# Patient Record
Sex: FEMALE | Race: OTHER | Employment: FULL TIME | URBAN - METROPOLITAN AREA
[De-identification: names, ages, dates, MRNs, and addresses within clinical notes are randomized per-mention and may not be internally consistent; named-entity substitution may affect disease eponyms.]

---

## 2017-03-14 ENCOUNTER — OFFICE VISIT (OUTPATIENT)
Dept: INTERNAL MEDICINE CLINIC | Facility: CLINIC | Age: 27
End: 2017-03-14

## 2017-03-14 DIAGNOSIS — J20.8 ACUTE BACTERIAL BRONCHITIS: Primary | ICD-10-CM

## 2017-03-14 DIAGNOSIS — Z13.31 DEPRESSION SCREENING: ICD-10-CM

## 2017-03-14 DIAGNOSIS — B96.89 ACUTE BACTERIAL BRONCHITIS: Primary | ICD-10-CM

## 2017-03-14 RX ORDER — AZITHROMYCIN 250 MG/1
TABLET, FILM COATED ORAL
Qty: 6 TAB | Refills: 0 | Status: SHIPPED | OUTPATIENT
Start: 2017-03-14 | End: 2017-03-19

## 2017-03-15 VITALS
RESPIRATION RATE: 18 BRPM | SYSTOLIC BLOOD PRESSURE: 120 MMHG | BODY MASS INDEX: 30.53 KG/M2 | HEIGHT: 66 IN | TEMPERATURE: 98.9 F | OXYGEN SATURATION: 99 % | HEART RATE: 109 BPM | WEIGHT: 190 LBS | DIASTOLIC BLOOD PRESSURE: 77 MMHG

## 2017-03-15 RX ORDER — ALBUTEROL SULFATE 90 UG/1
POWDER, METERED RESPIRATORY (INHALATION)
Refills: 0 | COMMUNITY
Start: 2017-03-08 | End: 2017-03-16

## 2017-03-15 NOTE — PROGRESS NOTES
Subjective:      Gbae Yin is a 31 y.o. female who is a new patient and is here to establish care and discuss cough. Previous followed by PCP Dr. Dain Angel. The following sections were reviewed & updated as appropriate: PMH, PL, PSH, FH, RxH, and SH. She is a . Cough: she was seen at [facility] and diagnosed with bronchitis last week. Flu testing was negative. She states she has had continued productive coughing and fevers since then. Today her fever was 101.8. She was given an albuterol inhaler and has been using tylenol and ibuprofen since to keep her fevers down. She states she is not sleeping due to the cough, has chest discomfort, and symptoms worsen when she lays down. She denies nausea, sore throat, sinus pressure. There are no active problems to display for this patient. Current Outpatient Prescriptions   Medication Sig Dispense Refill    PROAIR RESPICLICK 90 mcg/actuation aepb INL 2 PFS PO Q 4 H FOR 14 DAYS PRN  0    azithromycin (ZITHROMAX) 250 mg tablet Take two tablets today then one tablet daily. Finish entire course. 6 Tab 0    cyclobenzaprine (FLEXERIL) 10 mg tablet Take 1 Tab by mouth three (3) times daily as needed for Muscle Spasm(s). 20 Tab 0     No Known Allergies  Past Medical History:   Diagnosis Date    Anemia      Family History   Problem Relation Age of Onset    Diabetes Mother      Social History   Substance Use Topics    Smoking status: Former Smoker    Smokeless tobacco: Never Used      Comment: light social smoker    Alcohol use Yes      Comment: socially in moderation        Review of Systems    A comprehensive review of systems was negative except for that written in the HPI. Objective: There were no vitals taken for this visit.   General appearance: alert, cooperative, no distress, appears stated age  Head: Normocephalic, without obvious abnormality, atraumatic  Eyes: negative  Ears: normal TM's and external ear canals AU  Nose: Nares normal. Septum midline. Mucosa normal. No drainage or sinus tenderness. Throat: Lips, mucosa, and tongue normal. Teeth and gums normal  Neck: supple, symmetrical, trachea midline and mild anterior cervical adenopathy  Back: symmetric, no curvature. ROM normal. No CVA tenderness. Lungs: clear to auscultation throughout left lung fields, inspiratory wheeze noted in all right fields. No ronchi, rales noted  Heart: regular rate and rhythm, S1, S2 normal, no murmur, click, rub or gallop  Neurologic: Grossly normal  Nursing note and vitals reviewed  Assessment/Plan:       ICD-10-CM ICD-9-CM    1. Acute bacterial bronchitis J20.8 466.0     B96.89 041. 9      Visit was done while in downtime, CXR ordered and azithro sent to pharmacy. She will get CXR Thursday for continued fever or continued chest discomfort. Discussed when to escalate therapy and go to the ER. Advised her to call back or return to office if symptoms worsen/change/persist.  Discussed expected course/resolution/complications of diagnosis in detail with patient. Medication risks/benefits/costs/interactions/alternatives discussed with patient. She was given an after visit summary which includes diagnoses, current medications, & vitals. She expressed understanding with the diagnosis and plan.

## 2017-03-16 ENCOUNTER — OFFICE VISIT (OUTPATIENT)
Dept: INTERNAL MEDICINE CLINIC | Facility: CLINIC | Age: 27
End: 2017-03-16

## 2017-03-16 VITALS
HEIGHT: 66 IN | SYSTOLIC BLOOD PRESSURE: 121 MMHG | TEMPERATURE: 98.1 F | BODY MASS INDEX: 30.53 KG/M2 | RESPIRATION RATE: 18 BRPM | WEIGHT: 190 LBS | HEART RATE: 90 BPM | DIASTOLIC BLOOD PRESSURE: 73 MMHG

## 2017-03-16 DIAGNOSIS — B96.89 ACUTE BACTERIAL BRONCHITIS: Primary | ICD-10-CM

## 2017-03-16 DIAGNOSIS — J20.8 ACUTE BACTERIAL BRONCHITIS: Primary | ICD-10-CM

## 2017-03-16 PROBLEM — E66.9 OBESITY, CLASS I, BMI 30-34.9: Status: ACTIVE | Noted: 2017-03-16

## 2017-03-16 NOTE — MR AVS SNAPSHOT
Visit Information Date & Time Provider Department Dept. Phone Encounter #  
 3/16/2017  9:00 AM Najma Nayak NP [facility] Internal Medicine [phone] [id number] Follow-up Instructions Return if symptoms worsen or fail to improve. Upcoming Health Maintenance Date Due  
 PAP AKA CERVICAL CYTOLOGY 10/23/2010 INFLUENZA AGE 9 TO ADULT 8/1/2016 DTaP/Tdap/Td series (2 - Td) 3/26/2018 Allergies as of 3/16/2017  Review Complete On: 3/16/2017 By: Najma Nayak NP No Known Allergies Current Immunizations  Never Reviewed Name Date DTAP Vaccine 3/26/2008 Hepatitis B Vaccine 8/27/2001, 3/16/2001, 2/16/2001 IPV 4/16/2001, 2/16/2001, 7/23/1990 MMR Vaccine 3/16/2001, 2/16/2001 Varicella Virus Vaccine Live 2/16/2001  
 dT Vaccine 8/27/2001, 2/16/2001 Not reviewed this visit You Were Diagnosed With   
  
 Codes Comments Acute bacterial bronchitis    -  Primary ICD-10-CM: J20.8, B96.89 
ICD-9-CM: 466.0, 041.9 Vitals BP Pulse Temp Resp Height(growth percentile) Weight(growth percentile) 121/73 90 98.1 °F (36.7 °C) (Oral) 18 5' 6\" (1.676 m) 190 lb (86.2 kg) LMP BMI OB Status Smoking Status (Exact Date) 30.67 kg/m2 IUD Former Smoker BMI and BSA Data Body Mass Index Body Surface Area  
 30.67 kg/m 2 2 m 2 Preferred Pharmacy Pharmacy Name Phone [facility] [address] [place] [address] [phone] [facility] [phone] Your Updated Medication List  
  
   
This list is accurate as of: 3/16/17  9:25 AM.  Always use your most recent med list.  
  
  
  
  
 azithromycin 250 mg tablet Commonly known as:  Adam Franklin Take two tablets today then one tablet daily. Finish entire course. MIRENA 20 mcg/24 hr (5 years) IUD Generic drug:  levonorgestrel 1 Each by IntraUTERine route once. Follow-up Instructions Return if symptoms worsen or fail to improve. Patient Instructions Bronchitis: Care Instructions Your Care Instructions Bronchitis is inflammation of the bronchial tubes, which carry air to the lungs. The tubes swell and produce mucus, or phlegm. The mucus and inflamed bronchial tubes make you cough. You may have trouble breathing. Most cases of bronchitis are caused by viruses like those that cause colds. Antibiotics usually do not help and they may be harmful. Bronchitis usually develops rapidly and lasts about 2 to 3 weeks in otherwise healthy people. Follow-up care is a key part of your treatment and safety. Be sure to make and go to all appointments, and call your doctor if you are having problems. It's also a good idea to know your test results and keep a list of the medicines you take. How can you care for yourself at home? · Take all medicines exactly as prescribed. Call your doctor if you think you are having a problem with your medicine. · Get some extra rest. 
· Take an over-the-counter pain medicine, such as acetaminophen (Tylenol), ibuprofen (Advil, Motrin), or naproxen (Aleve) to reduce fever and relieve body aches. Read and follow all instructions on the label. · Do not take two or more pain medicines at the same time unless the doctor told you to. Many pain medicines have acetaminophen, which is Tylenol. Too much acetaminophen (Tylenol) can be harmful. · Take an over-the-counter cough medicine that contains dextromethorphan to help quiet a dry, hacking cough so that you can sleep. Avoid cough medicines that have more than one active ingredient. Read and follow all instructions on the label. · Breathe moist air from a humidifier, hot shower, or sink filled with hot water. The heat and moisture will thin mucus so you can cough it out. · Do not smoke. Smoking can make bronchitis worse.  If you need help quitting, talk to your doctor about stop-smoking programs and medicines. These can increase your chances of quitting for good. When should you call for help? Call 911 anytime you think you may need emergency care. For example, call if: 
· You have severe trouble breathing. Call your doctor now or seek immediate medical care if: 
· You have new or worse trouble breathing. · You cough up dark brown or bloody mucus (sputum). · You have a new or higher fever. · You have a new rash. Watch closely for changes in your health, and be sure to contact your doctor if: 
· You cough more deeply or more often, especially if you notice more mucus or a change in the color of your mucus. · You are not getting better as expected. Where can you learn more? Go to http://jonnathan-marcel.info/. Enter H333 in the search box to learn more about \"Bronchitis: Care Instructions. \" Current as of: May 23, 2016 Content Version: 11.1 © 3839-1542 [employer]. Care instructions adapted under license by [employer] (which disclaims liability or warranty for this information). If you have questions about a medical condition or this instruction, always ask your healthcare professional. [place] [address] any warranty or liability for your use of this information. Introducing [facility]! Savage Pang introduces [employer] patient portal. Now you can access parts of your medical record, email your doctor's office, and request medication refills online. 1. In your internet browser, go to [email or web address]. [email or web address] 2. Click on the First Time User? Click Here link in the Sign In box. You will see the New Member Sign Up page. 3. Enter your [employer] Access Code exactly as it appears below. You will not need to use this code after youve completed the sign-up process. If you do not sign up before the expiration date, you must request a new code. · [employer] Access Code: [email or web address] Expires: 6/14/2017  8:51 AM 
 
4. Enter the last four digits of your Social Security Number (xxxx) and Date of Birth (mm/dd/yyyy) as indicated and click Submit. You will be taken to the next sign-up page. 5. Create a [employer] ID. This will be your [employer] login ID and cannot be changed, so think of one that is secure and easy to remember. 6. Create a [employer] password. You can change your password at any time. 7. Enter your Password Reset Question and Answer. This can be used at a later time if you forget your password. 8. Enter your e-mail address. You will receive e-mail notification when new information is available in [address] 9. Click Sign Up. You can now view and download portions of your medical record. 10. Click the Download Summary menu link to download a portable copy of your medical information. If you have questions, please visit the Frequently Asked Questions section of the [employer] website. Remember, [employer] is NOT to be used for urgent needs. For medical emergencies, dial 911. Now available from your iPhone and Android! Please provide this summary of care documentation to your next provider. Your primary care clinician is listed as Pablo Bianchi. If you have any questions after today's visit, please call [phone].

## 2017-03-16 NOTE — PROGRESS NOTES
Subjective:       Raudel Bradley is a 31 y.o. female who presents today for follow up on bronchitis. Bronchitis: she states her fevers are better but she now has lost her voice. She is still coughing and it is now productive. Her last fever was on Tuesday, it broke that night after taking her Z-pack. She states her chest discomfort has improved from an 8/10 to 4/10. She states overall she is breathing better. She has 3 more days on the steroid. Patient Active Problem List    Diagnosis Date Noted    Obesity, Class I, BMI 30-34.9 03/16/2017     Current Outpatient Prescriptions   Medication Sig Dispense Refill    levonorgestrel (MIRENA) 20 mcg/24 hr (5 years) IUD 1 Each by IntraUTERine route once.  azithromycin (ZITHROMAX) 250 mg tablet Take two tablets today then one tablet daily. Finish entire course. 6 Tab 0     No Known Allergies  Past Medical History:   Diagnosis Date    Anemia      Family History   Problem Relation Age of Onset    Diabetes Mother      Social History   Substance Use Topics    Smoking status: Former Smoker    Smokeless tobacco: Never Used      Comment: light social smoker    Alcohol use Yes      Comment: socially in moderation        Review of Systems    A comprehensive review of systems was negative except for that written in the HPI. Objective:     Visit Vitals    /73    Pulse 90    Temp 98.1 °F (36.7 °C) (Oral)    Resp 18    Ht 5' 6\" (1.676 m)    Wt 190 lb (86.2 kg)    LMP  (Exact Date)    BMI 30.67 kg/m2     General appearance: alert, cooperative, no distress, appears stated age  Head: Normocephalic, without obvious abnormality, atraumatic  Eyes: negative  Ears: abnormal TM AD - air-fluid level, abnormal TM AS - air-fluid level  Nose: Nares normal. Septum midline. Mucosa normal. No drainage or sinus tenderness.   Throat: Lips, mucosa, and tongue normal. Teeth and gums normal  Neck: supple, symmetrical, trachea midline and mild anterior cervical adenopathy  Lungs: clear to auscultation bilaterally on left, right sided inspiratory wheeze that was cleared after coughing. Right sided wheeze noted to right upper and middle lobes   Heart: regular rate and rhythm, S1, S2 normal, no murmur, click, rub or gallop  Neurologic: Grossly normal  Nursing note and vitals reviewed  Assessment/Plan:       ICD-10-CM ICD-9-CM    1. Acute bacterial bronchitis J20.8 466.0     B96.89 041.9      Follow-up Disposition:  Return if symptoms worsen or fail to improve. Reviewed when to call or escalate care for worsening symptoms, return of fevers, SOB, chest pain. Patient stated understanding and reviewed bronchitis educational materials       Advised her to call back or return to office if symptoms worsen/change/persist.  Discussed expected course/resolution/complications of diagnosis in detail with patient. Medication risks/benefits/costs/interactions/alternatives discussed with patient. She was given an after visit summary which includes diagnoses, current medications, & vitals. She expressed understanding with the diagnosis and plan.

## 2017-03-16 NOTE — PATIENT INSTRUCTIONS
Bronchitis: Care Instructions  Your Care Instructions    Bronchitis is inflammation of the bronchial tubes, which carry air to the lungs. The tubes swell and produce mucus, or phlegm. The mucus and inflamed bronchial tubes make you cough. You may have trouble breathing. Most cases of bronchitis are caused by viruses like those that cause colds. Antibiotics usually do not help and they may be harmful. Bronchitis usually develops rapidly and lasts about 2 to 3 weeks in otherwise healthy people. Follow-up care is a key part of your treatment and safety. Be sure to make and go to all appointments, and call your doctor if you are having problems. It's also a good idea to know your test results and keep a list of the medicines you take. How can you care for yourself at home? · Take all medicines exactly as prescribed. Call your doctor if you think you are having a problem with your medicine. · Get some extra rest.  · Take an over-the-counter pain medicine, such as acetaminophen (Tylenol), ibuprofen (Advil, Motrin), or naproxen (Aleve) to reduce fever and relieve body aches. Read and follow all instructions on the label. · Do not take two or more pain medicines at the same time unless the doctor told you to. Many pain medicines have acetaminophen, which is Tylenol. Too much acetaminophen (Tylenol) can be harmful. · Take an over-the-counter cough medicine that contains dextromethorphan to help quiet a dry, hacking cough so that you can sleep. Avoid cough medicines that have more than one active ingredient. Read and follow all instructions on the label. · Breathe moist air from a humidifier, hot shower, or sink filled with hot water. The heat and moisture will thin mucus so you can cough it out. · Do not smoke. Smoking can make bronchitis worse. If you need help quitting, talk to your doctor about stop-smoking programs and medicines. These can increase your chances of quitting for good.   When should you call for help? Call 911 anytime you think you may need emergency care. For example, call if:  · You have severe trouble breathing. Call your doctor now or seek immediate medical care if:  · You have new or worse trouble breathing. · You cough up dark brown or bloody mucus (sputum). · You have a new or higher fever. · You have a new rash. Watch closely for changes in your health, and be sure to contact your doctor if:  · You cough more deeply or more often, especially if you notice more mucus or a change in the color of your mucus. · You are not getting better as expected. Where can you learn more? Go to http://jonnathan-marcel.info/. Enter H333 in the search box to learn more about \"Bronchitis: Care Instructions. \"  Current as of: May 23, 2016  Content Version: 11.1  © 0212-5305 [employer]. Care instructions adapted under license by [employer] (which disclaims liability or warranty for this information). If you have questions about a medical condition or this instruction, always ask your healthcare professional. [address] any warranty or liability for your use of this information.

## 2017-03-16 NOTE — PROGRESS NOTES
Chief Complaint   Patient presents with    Follow-up     Pt stated fever is gone, but she is loosing her voice.  Medication Evaluation     Z-danny       1. Have you been to the ER, urgent care clinic since your last visit? Hospitalized since your last visit? No    2. Have you seen or consulted any other health care providers outside of the [address] [place] [address] since your last visit? Include any pap smears or colon screening.  No

## 2017-04-21 ENCOUNTER — OFFICE VISIT (OUTPATIENT)
Dept: INTERNAL MEDICINE CLINIC | Facility: CLINIC | Age: 27
End: 2017-04-21

## 2017-04-21 VITALS
TEMPERATURE: 97.2 F | HEART RATE: 81 BPM | DIASTOLIC BLOOD PRESSURE: 74 MMHG | HEIGHT: 66 IN | BODY MASS INDEX: 30.37 KG/M2 | WEIGHT: 189 LBS | SYSTOLIC BLOOD PRESSURE: 108 MMHG | RESPIRATION RATE: 18 BRPM

## 2017-04-21 DIAGNOSIS — Z71.3 DIETARY COUNSELING: ICD-10-CM

## 2017-04-21 DIAGNOSIS — Z71.82 EXERCISE COUNSELING: ICD-10-CM

## 2017-04-21 DIAGNOSIS — F41.9 ANXIETY: ICD-10-CM

## 2017-04-21 DIAGNOSIS — Z00.01 ENCOUNTER FOR GENERAL ADULT MEDICAL EXAMINATION WITH ABNORMAL FINDINGS: Primary | ICD-10-CM

## 2017-04-21 DIAGNOSIS — B37.31 VAGINAL YEAST INFECTION: ICD-10-CM

## 2017-04-21 RX ORDER — ALPRAZOLAM 0.5 MG/1
0.5 TABLET ORAL
Qty: 20 TAB | Refills: 0 | Status: SHIPPED | OUTPATIENT
Start: 2017-04-21 | End: 2018-03-12 | Stop reason: ALTCHOICE

## 2017-04-21 RX ORDER — FLUCONAZOLE 150 MG/1
150 TABLET ORAL DAILY
Qty: 1 TAB | Refills: 0 | Status: SHIPPED | OUTPATIENT
Start: 2017-04-21 | End: 2017-04-22

## 2017-04-21 RX ORDER — SERTRALINE HYDROCHLORIDE 50 MG/1
50 TABLET, FILM COATED ORAL DAILY
Qty: 30 TAB | Refills: 2 | Status: SHIPPED | OUTPATIENT
Start: 2017-04-21 | End: 2017-05-12 | Stop reason: SINTOL

## 2017-04-21 NOTE — PATIENT INSTRUCTIONS
Starting a Weight Loss Plan: Care Instructions  Your Care Instructions  If you are thinking about losing weight, it can be hard to know where to start. Your doctor can help you set up a weight loss plan that best meets your needs. You may want to take a class on nutrition or exercise, or join a weight loss support group. If you have questions about how to make changes to your eating or exercise habits, ask your doctor about seeing a registered dietitian or an exercise specialist.  It can be a big challenge to lose weight. But you do not have to make huge changes at once. Make small changes, and stick with them. When those changes become habit, add a few more changes. If you do not think you are ready to make changes right now, try to pick a date in the future. Make an appointment to see your doctor to discuss whether the time is right for you to start a plan. Follow-up care is a key part of your treatment and safety. Be sure to make and go to all appointments, and call your doctor if you are having problems. Its also a good idea to know your test results and keep a list of the medicines you take. How can you care for yourself at home? · Set realistic goals. Many people expect to lose much more weight than is likely. A weight loss of 5% to 10% of your body weight may be enough to improve your health. · Get family and friends involved to provide support. Talk to them about why you are trying to lose weight, and ask them to help. They can help by participating in exercise and having meals with you, even if they may be eating something different. · Find what works best for you. If you do not have time or do not like to cook, a program that offers meal replacement bars or shakes may be better for you. Or if you like to prepare meals, finding a plan that includes daily menus and recipes may be best.  · Ask your doctor about other health professionals who can help you achieve your weight loss goals.   ¨ A dietitian can help you make healthy changes in your diet. ¨ An exercise specialist or  can help you develop a safe and effective exercise program.  ¨ A counselor or psychiatrist can help you cope with issues such as depression, anxiety, or family problems that can make it hard to focus on weight loss. · Consider joining a support group for people who are trying to lose weight. Your doctor can suggest groups in your area. Where can you learn more? Go to http://jonnathan-marcel.info/. Enter L251 in the search box to learn more about \"Starting a Weight Loss Plan: Care Instructions. \"  Current as of: October 13, 2016  Content Version: 11.2  © 3288-7664 [employer]. Care instructions adapted under license by [employer] (which disclaims liability or warranty for this information). If you have questions about a medical condition or this instruction, always ask your healthcare professional. [address] any warranty or liability for your use of this information. Mediterranean Diet: Care Instructions  Your Care Instructions  The Mediterranean diet features foods eaten in [place], [place], [place] and [place], and other countries that border the [facility]. It emphasizes eating a diet rich in fruits, vegetables, nuts, and high-fiber grains, and limits meat, cheese, and sweets. The Mediterranean diet may:  · Prevent heart disease and lower the risk of a heart attack or stroke. · Prevent type 2 diabetes. · Prevent Alzheimer's disease and other dementia. · Prevent depression. · Prevent Parkinson's disease. This diet contains more fat than other heart-healthy diets. But the fats are mainly from nuts, unsaturated oils, such as fish oils, olive oil, and certain nut or seed oils (such as canola, soybean, or flaxseed oil). These types of oils may help protect the heart and blood vessels.   Follow-up care is a key part of your treatment and safety. Be sure to make and go to all appointments, and call your doctor if you are having problems. It's also a good idea to know your test results and keep a list of the medicines you take. How can you care for yourself at home? What to eat  · Eat a variety of fruits and vegetables each day, such as grapes, blueberries, tomatoes, broccoli, peppers, figs, olives, spinach, eggplant, beans, lentils, and chickpeas. · Eat a variety of whole-grain foods each day, such as oats, brown rice, and whole wheat bread, pasta, and couscous. · Eat fish at least 2 times a week. Try tuna, salmon, mackerel, lake trout, herring, or sardines. · Eat moderate amounts of low-fat dairy products, such as milk, cheese, or yogurt. · Eat moderate amounts of poultry and eggs. · Choose healthy (unsaturated) fats, such as nuts, olive oil and certain nut or seed oils like canola, soybean, and flaxseed. · Limit unhealthy (saturated) fats, such as butter, palm oil, and coconut oil. And limit fats found in animal products, such as meat and dairy products made with whole milk. Try to eat red meat only a few times a month in very small amounts. · Limit sweets and desserts to only a few times a week. This includes sugar-sweetened drinks like soda. The Mediterranean diet may also include red wine with your meal--1 glass each day for women and up to 2 glasses a day for men. Tips for changing your diet  · Dip bread in a mix of olive oil and fresh herbs instead of using butter. · Add avocado slices to your sandwich instead of bianchi. · Have fish for lunch or dinner instead of red meat. Brush the fish with olive oil, and broil or grill it. · Sprinkle your salad with seeds or nuts instead of cheese. · Cook with olive or canola oil instead of butter or oils that are high in saturated fat. · Switch from 2% milk or whole milk to 1% or fat-free milk.   · Dip raw vegetables in a vinaigrette dressing or hummus instead of dips made from mayonnaise or sour cream.  · Have a piece of fruit for dessert instead of a piece of cake. Try baked apples, or have some dried fruit. Part of the Mediterranean diet is being active. Get at least 30 minutes of exercise on most days of the week. Walking is a good choice. You also may want to do other activities, such as running, swimming, cycling, or playing tennis or team sports. Where can you learn more? Go to http://jonnathan-marcel.info/. Enter O407 in the search box to learn more about \"Mediterranean Diet: Care Instructions. \"  Current as of: October 21, 2016  Content Version: 11.2  © 4967-7591 [email or web address]. Care instructions adapted under license by [employer] (which disclaims liability or warranty for this information). If you have questions about a medical condition or this instruction, always ask your healthcare professional. [place] [address] any warranty or liability for your use of this information. Sertraline (By mouth)   Sertraline (SER-tra-olivier)  Treats depression, obsessive-compulsive disorder (OCD), posttraumatic stress disorder (PTSD), premenstrual dysphoric disorder (PMDD), social anxiety disorder, and panic disorder. This medicine is an SSRI. Brand Name(s):Zoloft   There may be other brand names for this medicine. When This Medicine Should Not Be Used: This medicine is not right for everyone. Do not use it if you had an allergic reaction to sertraline. How to Use This Medicine:   Liquid, Tablet  · Take your medicine as directed. Your dose may need to be changed several times to find what works best for you. You may need to take it for a few weeks or months before you feel better. · Oral liquid: Use the dropper provided to remove the medicine and mix it with 1/2 cup (4 ounces) of water, ginger ale, lemon-lime soda, lemonade, or orange juice. Drink the mixture right away. It is normal for it to look a bit hazy.   · This medicine should come with a Medication Guide. Ask your pharmacist for a copy if you do not have one. · Missed dose: Take a dose as soon as you remember. If it is almost time for your next dose, wait until then and take a regular dose. Do not take extra medicine to make up for a missed dose. · Store the medicine in a closed container at room temperature, away from heat, moisture, and direct light. Drugs and Foods to Avoid:   Ask your doctor or pharmacist before using any other medicine, including over-the-counter medicines, vitamins, and herbal products. · Do not use this medicine together with pimozide. Do not use this medicine and an MAO inhibitor (MAOI) within 14 days of each other. Do not use the oral liquid form of sertraline if you are also using disulfiram.  · Some medicines can affect how sertraline works. Tell your doctor if you are using the following:   ¨ Buspirone, cimetidine, cisapride, diazepam, digitoxin, fentanyl, flecainide, lithium, phenytoin, propafenone, Charlotte's wort, tramadol, tryptophan supplements, or valproate  ¨ A blood thinner (such as warfarin), a diuretic (water pill), an NSAID pain or arthritis medicine (such as aspirin, diclofenac, ibuprofen), a tricyclic antidepressant, a triptan medicine for migraine headaches  · Do not drink alcohol while you are using this medicine. Warnings While Using This Medicine:   · Tell your doctor if you are pregnant or breastfeeding, or if you have liver disease, bleeding problems, glaucoma, heart disease, or a seizure disorder. · For some children, teenagers, and young adults, this medicine may increase mental or emotional problems. This may lead to thoughts of suicide and violence. Talk with your doctor right away if you have any thoughts or behavior changes that concern you. Tell your doctor if you or anyone in your family has a history of bipolar disorder or suicide attempts.   · This medicine may cause the following problems:   ¨ Serotonin syndrome (when taken with certain medicines)  ¨ Low sodium levels (more common in elderly patients and those who take diuretics or become dehydrated)  · Tell your doctor if you are sensitive to latex, because the oral liquid comes with a latex rubber dropper. · This medicine may make you dizzy or drowsy. Do not drive or do anything that could be dangerous until you know how this medicine affects you. · Do not stop using this medicine suddenly. Your doctor will need to slowly decrease your dose before you stop it completely. · Your doctor will check your progress and the effects of this medicine at regular visits. Keep all appointments. · Keep all medicine out of the reach of children. Never share your medicine with anyone. Possible Side Effects While Using This Medicine:   Call your doctor right away if you notice any of these side effects:  · Allergic reaction: Itching or hives, swelling in your face or hands, swelling or tingling in your mouth or throat, chest tightness, trouble breathing  · Anxiety, restlessness, fast heartbeat, fever, sweating, muscle spasms, twitching, nausea, vomiting, diarrhea, seeing or hearing things that are not there  · Blistering, peeling, or red skin rash  · Confusion, weakness, and muscle twitching  · Eye pain, vision changes, seeing halos around lights  · Feeling more excited or energetic than usual  · Thoughts of hurting yourself or others, unusual behavior  · Unusual bleeding or bruising  If you notice these less serious side effects, talk with your doctor:   · Dry mouth  · Loss of appetite, weight loss  · Mild diarrhea, constipation, nausea, vomiting  · Sexual problems  · Sleepiness, or trouble sleeping  If you notice other side effects that you think are caused by this medicine, tell your doctor. Call your doctor for medical advice about side effects.  You may report side effects to FDA at 9-366-OUF-1518  © 2016 [address] is for End User's use only and may not be sold, redistributed or otherwise used for commercial purposes. The above information is an  only. It is not intended as medical advice for individual conditions or treatments. Talk to your doctor, nurse or pharmacist before following any medical regimen to see if it is safe and effective for you. Alprazolam (By mouth)   Alprazolam (ay-ZRU-ayi-nicholas)  Treats anxiety and panic disorder. Brand Name(s):ALPRAZolam Intensol, Niravam, Xanax, Xanax XR   There may be other brand names for this medicine. When This Medicine Should Not Be Used: This medicine is not right for everyone. Do not use this medicine if you had an allergic reaction to alprazolam or to similar medicines, are pregnant, or have narrow-angle glaucoma. How to Use This Medicine:   Tablet, Liquid, Dissolving Tablet, Long Acting Tablet  · Take your medicine as directed. Your dose may need to be changed several times to find what works best for you. · Measure the oral liquid medicine with a marked measuring spoon, oral syringe, or medicine cup. · Extended-release tablet: Swallow the extended-release tablet whole. Do not crush, break, or chew it. · Disintegrating tablet: Dry your hands before you handle the tablet. Place the tablet on your tongue and let it dissolve. · Missed dose: Take a dose as soon as you remember. If it is almost time for your next dose, wait until then and take a regular dose. Do not take extra medicine to make up for a missed dose. · Store the medicine in a closed container at room temperature, away from heat, moisture, and direct light. Throw away any cotton that was in the bottle and reseal it tightly after each use. Drugs and Foods to Avoid:   Ask your doctor or pharmacist before using any other medicine, including over-the-counter medicines, vitamins, and herbal products. · Do not use this medicine if you are also using ketoconazole or itraconazole.   · Some medicines and foods can affect how alprazolam works. Tell your doctor if you are using clarithromycin, cimetidine, cyclosporine, desipramine, diltiazem, ergotamine, erythromycin, fluconazole, fluoxetine, fluvoxamine, imipramine, nefazodone, nicardipine, nifedipine, sertraline, theophylline, birth control pills, or seizure medicine. · Do not eat grapefruit or drink grapefruit juice while you are using this medicine. · Tell your doctor if you use anything else that makes you sleepy. Some examples are allergy medicine, narcotic pain medicine, and alcohol. Warnings While Using This Medicine:   · It is not safe to take this medicine during pregnancy. It could harm an unborn baby. Tell your doctor right away if you become pregnant. · Tell your doctor if you are breastfeeding, or if you have glaucoma, lung problems, liver disease, kidney disease, or a history of drug or alcohol addiction, depression, mental illness, or seizures. Tell your doctor if you drink alcohol. · This medicine can be habit-forming. Do not use more than your prescribed dose. Call your doctor if you think your medicine is not working. · This drug has a higher risk of overdose. Call your doctor if you have extreme dizziness or weakness, a slow heartbeat, or problems with coordination or memory. · This medicine may make you dizzy or drowsy. Do not drive, use machines, or do anything else that could be dangerous until you know how this medicine affects you. · Do not stop using this medicine suddenly. Your doctor will need to slowly decrease your dose before you stop it completely. · Keep all medicine out of the reach of children. Never share your medicine with anyone.   Possible Side Effects While Using This Medicine:   Call your doctor right away if you notice any of these side effects:  · Allergic reaction: Itching or hives, swelling in your face or hands, swelling or tingling in your mouth or throat, chest tightness, trouble breathing  · Blistering, peeling, red skin rash  · Confusion, problems with coordination or memory  · Extreme tiredness or weakness, slow heartbeat, trouble breathing or speaking  · Seizure  If you notice these less serious side effects, talk with your doctor:   · Change in appetite or weight  · Constipation  · Lightheadedness, drowsiness  · Nervousness, restlessness  · Loss of interest in sex  If you notice other side effects that you think are caused by this medicine, tell your doctor. Call your doctor for medical advice about side effects. You may report side effects to FDA at 0-587-OTV-0864  © 2016 [address] is for End User's use only and may not be sold, redistributed or otherwise used for commercial purposes. The above information is an  only. It is not intended as medical advice for individual conditions or treatments. Talk to your doctor, nurse or pharmacist before following any medical regimen to see if it is safe and effective for you.

## 2017-04-21 NOTE — PROGRESS NOTES
Subjective:      Carlos Costello is a 31 y.o. female who presents today for CPE, she is not fasting. She is  and has a son who is almost 3 years, named Carley Wong. Health Maintenance  Immunizations:    Influenza: up to date. Tetanus: up to date. Cancer screening:    Cervical: UTD, done by OBGYN, records requested. Breast: UTD, done by OBGYN, records requested     Vaginal yeast: she states she is having vaginal itching for 2 days, she has had a yeast infection in the past and states this feels the same. No new sexual partners, no vaginal discharge, she has not concerns for stds. Exercise: no formal exercise, she plans to do a biking trip with her family this summer. Diet: she states she drinks a coffee with 5 cream and 10 sugars with 2 shots of espresso. Mental Health Review  Patient has concerns for anxiety disorder. She states she feels like she has multiple stressors, She has tried running to control symptoms. Ongoing symptoms include: she feels like she is loosing control, easily set off, mood swings, she states this is happening 1-2 times daily where she feels overwhelmed, feels light headed. She denies: SI/SA. PHQ shows moderate depression, dysthmia    Patient Care Team:  Conner Jon NP as PCP - General (Nurse Practitioner)       The following sections were reviewed & updated as appropriate: PMH, PSH, FH, and SH. Patient Active Problem List   Diagnosis Code    Obesity, Class I, BMI 30-34.9 E66.9      Prior to Admission medications    Medication Sig Start Date End Date Taking? Authorizing Provider   levonorgestrel (MIRENA) 20 mcg/24 hr (5 years) IUD 1 Each by IntraUTERine route once.    Yes Historical Provider     No Known Allergies     Family History   Problem Relation Age of Onset    Diabetes Mother      Social History   Substance Use Topics    Smoking status: Former Smoker    Smokeless tobacco: Never Used      Comment: light social smoker    Alcohol use Yes Comment: socially in moderation        Review of Systems    A comprehensive review of systems was negative except for that written in the HPI. Objective:     Visit Vitals    /74 (BP 1 Location: Left arm, BP Patient Position: Sitting)    Pulse 81    Temp 97.2 °F (36.2 °C) (Oral)    Resp 18    Ht 5' 6\" (1.676 m)    Wt 189 lb (85.7 kg)    BMI 30.51 kg/m2     General:  Alert, cooperative, no distress, appears stated age. Head:  Normocephalic, without obvious abnormality, atraumatic. Eyes:  Conjunctivae/corneas clear. PERRL, EOMs intact. Ears:  Normal TMs and external ear canals both ears. Nose: Nares normal. Septum midline. Mucosa normal. No drainage or sinus tenderness. Throat: Lips, mucosa, and tongue normal. Teeth and gums normal.   Neck: Supple, symmetrical, trachea midline, no adenopathy, thyroid: no enlargement/tenderness/nodules, no carotid bruit and no JVD. Back:   Symmetric, no curvature. ROM normal. No CVA tenderness. Lungs:   Clear to auscultation bilaterally. Chest wall:  No tenderness or deformity. Heart:  Regular rate and rhythm, S1, S2 normal, no murmur, click, rub or gallop. Abdomen:   Soft, non-tender. Bowel sounds normal. No masses,  No organomegaly. Extremities  Genital: Extremities normal, atraumatic, no cyanosis or edema. Declines   Pulses: 2+ and symmetric all extremities. Skin: Skin color, texture, turgor normal. No rashes or lesions. Lymph nodes: Cervical, supraclavicular, and axillary nodes normal.   Neurologic: CNII-XII intact. Normal strength, sensation throughout. Nursing note and vitals reviewed  Assessment/Plan:       ICD-10-CM ICD-9-CM    1. Encounter for general adult medical examination with abnormal findings Z00.01 V70.0 CBC WITH AUTOMATED DIFF      LIPID PANEL      METABOLIC PANEL, COMPREHENSIVE      TSH 3RD GENERATION   2. Vaginal yeast infection B37.3 112.1 fluconazole (DIFLUCAN) 150 mg tablet   3.  BMI 30.0-30.9,adult Z68.30 V85.30 4. Anxiety F41.9 300.00 sertraline (ZOLOFT) 50 mg tablet      ALPRAZolam (XANAX) 0.5 mg tablet   5. Dietary counseling Z71.3 V65.3    6. Exercise counseling Z71.89 V65.41      Follow-up Disposition:  Return in about 4 weeks (around 5/17/2017) for Anxiety. Advised her to call back or return to office if symptoms worsen/change/persist.  Discussed expected course/resolution/complications of diagnosis in detail with patient. Medication risks/benefits/costs/interactions/alternatives discussed with patient. She was given an after visit summary which includes diagnoses, current medications, & vitals. She expressed understanding with the diagnosis and plan.

## 2017-04-21 NOTE — PROGRESS NOTES
Chief Complaint   Patient presents with    Physical     1. Have you been to the ER, urgent care clinic since your last visit? Hospitalized since your last visit? No    2. Have you seen or consulted any other health care providers outside of the [address] [place] [address] since your last visit? Include any pap smears or colon screening.  No

## 2017-04-21 NOTE — MR AVS SNAPSHOT
Visit Information Date & Time Provider Department Dept. Phone Encounter #  
 4/21/2017 11:00 AM Gayle Jackman NP [facility] Internal Medicine [phone] [id number] Follow-up Instructions Return in about 4 weeks (around 5/17/2017) for Anxiety. Upcoming Health Maintenance Date Due INFLUENZA AGE 9 TO ADULT 8/1/2017* DTaP/Tdap/Td series (2 - Td) 3/26/2018 PAP AKA CERVICAL CYTOLOGY 7/6/2019 *Topic was postponed. The date shown is not the original due date. Allergies as of 4/21/2017  Review Complete On: 4/21/2017 By: Mikal Hamlin LPN No Known Allergies Current Immunizations  Never Reviewed Name Date DTAP Vaccine 3/26/2008 Hepatitis B Vaccine 8/27/2001, 3/16/2001, 2/16/2001 IPV 4/16/2001, 2/16/2001, 7/23/1990 MMR Vaccine 3/16/2001, 2/16/2001 Varicella Virus Vaccine Live 2/16/2001  
 dT Vaccine 8/27/2001, 2/16/2001 Not reviewed this visit You Were Diagnosed With   
  
 Codes Comments Encounter for general adult medical examination with abnormal findings    -  Primary ICD-10-CM: Z00.01 
ICD-9-CM: V70.0 Vaginal yeast infection     ICD-10-CM: B37.3 ICD-9-CM: 112.1 BMI 30.0-30.9,adult     ICD-10-CM: Z68.30 ICD-9-CM: V85.30 Anxiety     ICD-10-CM: F41.9 ICD-9-CM: 300.00 Dietary counseling     ICD-10-CM: Z71.3 ICD-9-CM: V65.3 Exercise counseling     ICD-10-CM: Z71.89 ICD-9-CM: V65.41 Vitals BP Pulse Temp Resp Height(growth percentile) Weight(growth percentile) 108/74 (BP 1 Location: Left arm, BP Patient Position: Sitting) 81 97.2 °F (36.2 °C) (Oral) 18 5' 6\" (1.676 m) 189 lb (85.7 kg) BMI OB Status Smoking Status 30.51 kg/m2 IUD Former Smoker Vitals History BMI and BSA Data Body Mass Index Body Surface Area 30.51 kg/m 2 2 m 2 Preferred Pharmacy Pharmacy Name Phone  [address] [phone] Your Updated Medication List  
  
   
This list is accurate as of: 4/21/17 11:36 AM.  Always use your most recent med list.  
  
  
  
  
 ALPRAZolam 0.5 mg tablet Commonly known as:  Marnie Mannheim Take 1 Tab by mouth daily as needed for Anxiety (do not take more than 2 tablets a day. Start with 1/2 tab). fluconazole 150 mg tablet Commonly known as:  DIFLUCAN Take 1 Tab by mouth daily for 1 day. FDA advises cautious prescribing of oral fluconazole in pregnancy. MIRENA 20 mcg/24 hr (5 years) IUD Generic drug:  levonorgestrel 1 Each by IntraUTERine route once. sertraline 50 mg tablet Commonly known as:  ZOLOFT Take 1 Tab by mouth daily. Prescriptions Printed Refills ALPRAZolam (XANAX) 0.5 mg tablet 0 Sig: Take 1 Tab by mouth daily as needed for Anxiety (do not take more than 2 tablets a day. Start with 1/2 tab). Class: Print Route: Oral  
  
Prescriptions Sent to Pharmacy Refills  
 fluconazole (DIFLUCAN) 150 mg tablet 0 Sig: Take 1 Tab by mouth daily for 1 day. FDA advises cautious prescribing of oral fluconazole in pregnancy. Class: Normal  
 Pharmacy: [facility] [place] [address] AT [address] [place] [address] Ph #: [phone] Route: Oral  
 sertraline (ZOLOFT) 50 mg tablet 2 Sig: Take 1 Tab by mouth daily. Class: Normal  
 Pharmacy: [facility] [address], [address] [place] [address] RD AT [address] [place] [address] Ph #: [phone] Route: Oral  
  
We Performed the Following CBC WITH AUTOMATED DIFF [83820 CPT(R)] LIPID PANEL [00627 CPT(R)] METABOLIC PANEL, COMPREHENSIVE [70286 CPT(R)] TSH 3RD GENERATION [95885 CPT(R)] Follow-up Instructions Return in about 4 weeks (around 5/17/2017) for Anxiety. Patient Instructions Starting a Weight Loss Plan: Care Instructions Your Care Instructions If you are thinking about losing weight, it can be hard to know where to start. Your doctor can help you set up a weight loss plan that best meets your needs. You may want to take a class on nutrition or exercise, or join a weight loss support group. If you have questions about how to make changes to your eating or exercise habits, ask your doctor about seeing a registered dietitian or an exercise specialist. 
It can be a big challenge to lose weight. But you do not have to make huge changes at once. Make small changes, and stick with them. When those changes become habit, add a few more changes. If you do not think you are ready to make changes right now, try to pick a date in the future. Make an appointment to see your doctor to discuss whether the time is right for you to start a plan. Follow-up care is a key part of your treatment and safety. Be sure to make and go to all appointments, and call your doctor if you are having problems. Its also a good idea to know your test results and keep a list of the medicines you take. How can you care for yourself at home? · Set realistic goals. Many people expect to lose much more weight than is likely. A weight loss of 5% to 10% of your body weight may be enough to improve your health. · Get family and friends involved to provide support. Talk to them about why you are trying to lose weight, and ask them to help. They can help by participating in exercise and having meals with you, even if they may be eating something different. · Find what works best for you. If you do not have time or do not like to cook, a program that offers meal replacement bars or shakes may be better for you. Or if you like to prepare meals, finding a plan that includes daily menus and recipes may be best. 
· Ask your doctor about other health professionals who can help you achieve your weight loss goals. ¨ A dietitian can help you make healthy changes in your diet. ¨ An exercise specialist or  can help you develop a safe and effective exercise program. 
¨ A counselor or psychiatrist can help you cope with issues such as depression, anxiety, or family problems that can make it hard to focus on weight loss. · Consider joining a support group for people who are trying to lose weight. Your doctor can suggest groups in your area. Where can you learn more? Go to http://jonnathanDatalogixmarcel.info/. Enter U924 in the search box to learn more about \"Starting a Weight Loss Plan: Care Instructions. \" Current as of: October 13, 2016 Content Version: 11.2 © 6709-2773 [employer]. Care instructions adapted under license by [employer] (which disclaims liability or warranty for this information). If you have questions about a medical condition or this instruction, always ask your healthcare professional. [address] any warranty or liability for your use of this information. Mediterranean Diet: Care Instructions Your Care Instructions The Mediterranean diet features foods eaten in [place], [place], [place] and [place], and other countries that border the [place] [facility]. It emphasizes eating a diet rich in fruits, vegetables, nuts, and high-fiber grains, and limits meat, cheese, and sweets. The Mediterranean diet may: · Prevent heart disease and lower the risk of a heart attack or stroke. · Prevent type 2 diabetes. · Prevent Alzheimer's disease and other dementia. · Prevent depression. · Prevent Parkinson's disease. This diet contains more fat than other heart-healthy diets. But the fats are mainly from nuts, unsaturated oils, such as fish oils, olive oil, and certain nut or seed oils (such as canola, soybean, or flaxseed oil). These types of oils may help protect the heart and blood vessels. Follow-up care is a key part of your treatment and safety.  Be sure to make and go to all appointments, and call your doctor if you are having problems. It's also a good idea to know your test results and keep a list of the medicines you take. How can you care for yourself at home? What to eat · Eat a variety of fruits and vegetables each day, such as grapes, blueberries, tomatoes, broccoli, peppers, figs, olives, spinach, eggplant, beans, lentils, and chickpeas. · Eat a variety of whole-grain foods each day, such as oats, brown rice, and whole wheat bread, pasta, and couscous. · Eat fish at least 2 times a week. Try tuna, salmon, mackerel, lake trout, herring, or sardines. · Eat moderate amounts of low-fat dairy products, such as milk, cheese, or yogurt. · Eat moderate amounts of poultry and eggs. · Choose healthy (unsaturated) fats, such as nuts, olive oil and certain nut or seed oils like canola, soybean, and flaxseed. · Limit unhealthy (saturated) fats, such as butter, palm oil, and coconut oil. And limit fats found in animal products, such as meat and dairy products made with whole milk. Try to eat red meat only a few times a month in very small amounts. · Limit sweets and desserts to only a few times a week. This includes sugar-sweetened drinks like soda. The Mediterranean diet may also include red wine with your meal1 glass each day for women and up to 2 glasses a day for men. Tips for changing your diet · Dip bread in a mix of olive oil and fresh herbs instead of using butter. · Add avocado slices to your sandwich instead of bianchi. · Have fish for lunch or dinner instead of red meat. Brush the fish with olive oil, and broil or grill it. · Sprinkle your salad with seeds or nuts instead of cheese. · Cook with olive or canola oil instead of butter or oils that are high in saturated fat. · Switch from 2% milk or whole milk to 1% or fat-free milk.  
· Dip raw vegetables in a vinaigrette dressing or hummus instead of dips made from mayonnaise or sour cream. 
 · Have a piece of fruit for dessert instead of a piece of cake. Try baked apples, or have some dried fruit. Part of the Mediterranean diet is being active. Get at least 30 minutes of exercise on most days of the week. Walking is a good choice. You also may want to do other activities, such as running, swimming, cycling, or playing tennis or team sports. Where can you learn more? Go to http://jonnathan-marcel.info/. Enter O407 in the search box to learn more about \"Mediterranean Diet: Care Instructions. \" Current as of: October 21, 2016 Content Version: 11.2 © 4078-7895 [employer]. Care instructions adapted under license by [employer] (which disclaims liability or warranty for this information). If you have questions about a medical condition or this instruction, always ask your healthcare professional. [address] any warranty or liability for your use of this information. Introducing [facility]! Kady Bazan introduces [employer]vers patient portal. Now you can access parts of your medical record, email your doctor's office, and request medication refills online. 1. In your internet browser, go to [email or web address] 2. Click on the First Time User? Click Here link in the Sign In box. You will see the New Member Sign Up page. 3. Enter your Zolvers Access Code exactly as it appears below. You will not need to use this code after youve completed the sign-up process. If you do not sign up before the expiration date, you must request a new code. · Zolvers Access Code: [email or web address] Expires: 6/14/2017  8:51 AM 
 
4. Enter the last four digits of your Social Security Number (xxxx) and Date of Birth (mm/dd/yyyy) as indicated and click Submit. You will be taken to the next sign-up page. 5. Create a Zolvers ID.  This will be your Zolvers login ID and cannot be changed, so think of one that is secure and easy to remember. 6. Create a eWave Interactive password. You can change your password at any time. 7. Enter your Password Reset Question and Answer. This can be used at a later time if you forget your password. 8. Enter your e-mail address. You will receive e-mail notification when new information is available in [address] 9. Click Sign Up. You can now view and download portions of your medical record. 10. Click the Download Summary menu link to download a portable copy of your medical information. If you have questions, please visit the Frequently Asked Questions section of the [employer]Wave Interactive website. Remember, eWave Interactive is NOT to be used for urgent needs. For medical emergencies, dial 911. Now available from your iPhone and Android! Please provide this summary of care documentation to your next provider. Your primary care clinician is listed as Victorine [place]. If you have any questions after today's visit, please call [phone].

## 2017-04-23 LAB
ALBUMIN SERPL-MCNC: 4.7 G/DL (ref 3.5–5.5)
ALBUMIN/GLOB SERPL: 1.7 {RATIO} (ref 1.2–2.2)
ALP SERPL-CCNC: 70 IU/L (ref 39–117)
ALT SERPL-CCNC: 15 IU/L (ref 0–32)
AST SERPL-CCNC: 17 IU/L (ref 0–40)
BASOPHILS # BLD AUTO: 0 X10E3/UL (ref 0–0.2)
BASOPHILS NFR BLD AUTO: 1 %
BILIRUB SERPL-MCNC: 0.4 MG/DL (ref 0–1.2)
BUN SERPL-MCNC: 13 MG/DL (ref 6–20)
BUN/CREAT SERPL: 24 (ref 9–23)
CALCIUM SERPL-MCNC: 9.9 MG/DL (ref 8.7–10.2)
CHLORIDE SERPL-SCNC: 96 MMOL/L (ref 96–106)
CHOLEST SERPL-MCNC: 181 MG/DL (ref 100–199)
CO2 SERPL-SCNC: 24 MMOL/L (ref 18–29)
CREAT SERPL-MCNC: 0.55 MG/DL (ref 0.57–1)
EOSINOPHIL # BLD AUTO: 0.1 X10E3/UL (ref 0–0.4)
EOSINOPHIL NFR BLD AUTO: 2 %
ERYTHROCYTE [DISTWIDTH] IN BLOOD BY AUTOMATED COUNT: 16.9 % (ref 12.3–15.4)
GLOBULIN SER CALC-MCNC: 2.8 G/DL (ref 1.5–4.5)
GLUCOSE SERPL-MCNC: 87 MG/DL (ref 65–99)
HCT VFR BLD AUTO: 39.6 % (ref 34–46.6)
HDLC SERPL-MCNC: 60 MG/DL
HGB BLD-MCNC: 12.3 G/DL (ref 11.1–15.9)
IMM GRANULOCYTES # BLD: 0 X10E3/UL (ref 0–0.1)
IMM GRANULOCYTES NFR BLD: 0 %
LDLC SERPL CALC-MCNC: 105 MG/DL (ref 0–99)
LYMPHOCYTES # BLD AUTO: 2.7 X10E3/UL (ref 0.7–3.1)
LYMPHOCYTES NFR BLD AUTO: 42 %
MCH RBC QN AUTO: 22.3 PG (ref 26.6–33)
MCHC RBC AUTO-ENTMCNC: 31.1 G/DL (ref 31.5–35.7)
MCV RBC AUTO: 72 FL (ref 79–97)
MONOCYTES # BLD AUTO: 0.5 X10E3/UL (ref 0.1–0.9)
MONOCYTES NFR BLD AUTO: 7 %
NEUTROPHILS # BLD AUTO: 3.2 X10E3/UL (ref 1.4–7)
NEUTROPHILS NFR BLD AUTO: 48 %
PLATELET # BLD AUTO: 288 X10E3/UL (ref 150–379)
POTASSIUM SERPL-SCNC: 4.6 MMOL/L (ref 3.5–5.2)
PROT SERPL-MCNC: 7.5 G/DL (ref 6–8.5)
RBC # BLD AUTO: 5.52 X10E6/UL (ref 3.77–5.28)
SODIUM SERPL-SCNC: 137 MMOL/L (ref 134–144)
TRIGL SERPL-MCNC: 78 MG/DL (ref 0–149)
TSH SERPL DL<=0.005 MIU/L-ACNC: 0.7 UIU/ML (ref 0.45–4.5)
VLDLC SERPL CALC-MCNC: 16 MG/DL (ref 5–40)
WBC # BLD AUTO: 6.6 X10E3/UL (ref 3.4–10.8)

## 2017-04-25 ENCOUNTER — TELEPHONE (OUTPATIENT)
Dept: INTERNAL MEDICINE CLINIC | Facility: CLINIC | Age: 27
End: 2017-04-25

## 2017-04-25 NOTE — TELEPHONE ENCOUNTER
----- Message from No Noonan NP sent at 4/24/2017  4:24 PM EDT -----  Can you see if they can add serum iron levels to her labs?

## 2017-04-25 NOTE — TELEPHONE ENCOUNTER
Per NP Skiff, contacted [facility] and spoke to Sobeida Ann to add test [id number] Serum Iron to existing order.  Andi Huber, LUISN

## 2017-04-26 PROBLEM — D64.9 ANEMIA: Status: ACTIVE | Noted: 2017-04-26

## 2017-04-26 LAB
IRON SERPL-MCNC: 100 UG/DL (ref 27–159)
SPECIMEN STATUS REPORT, ROLRST: NORMAL

## 2017-05-12 ENCOUNTER — OFFICE VISIT (OUTPATIENT)
Dept: INTERNAL MEDICINE CLINIC | Facility: CLINIC | Age: 27
End: 2017-05-12

## 2017-05-12 VITALS
HEIGHT: 66 IN | TEMPERATURE: 96 F | HEART RATE: 82 BPM | SYSTOLIC BLOOD PRESSURE: 113 MMHG | BODY MASS INDEX: 30.22 KG/M2 | RESPIRATION RATE: 18 BRPM | DIASTOLIC BLOOD PRESSURE: 60 MMHG | WEIGHT: 188 LBS

## 2017-05-12 DIAGNOSIS — R10.2 SUPRAPUBIC PRESSURE: ICD-10-CM

## 2017-05-12 DIAGNOSIS — F41.9 ANXIETY DISORDER, UNSPECIFIED TYPE: Primary | ICD-10-CM

## 2017-05-12 LAB
BILIRUB UR QL STRIP: NEGATIVE
GLUCOSE UR-MCNC: NEGATIVE MG/DL
KETONES P FAST UR STRIP-MCNC: NEGATIVE MG/DL
PH UR STRIP: 7.5 [PH] (ref 4.6–8)
PROT UR QL STRIP: NEGATIVE MG/DL
SP GR UR STRIP: 1.02 (ref 1–1.03)
UA UROBILINOGEN AMB POC: NORMAL (ref 0.2–1)
URINALYSIS CLARITY POC: CLEAR
URINALYSIS COLOR POC: YELLOW
URINE BLOOD POC: NEGATIVE
URINE LEUKOCYTES POC: NEGATIVE
URINE NITRITES POC: NEGATIVE

## 2017-05-12 RX ORDER — BUPROPION HYDROCHLORIDE 150 MG/1
150 TABLET ORAL
Qty: 30 TAB | Refills: 0 | Status: SHIPPED | OUTPATIENT
Start: 2017-05-12 | End: 2017-06-04 | Stop reason: SDUPTHER

## 2017-05-12 NOTE — MR AVS SNAPSHOT
Visit Information Date & Time Provider Department Dept. Phone Encounter #  
 5/12/2017  9:00 AM No Noonan NP [facility] [phone] [id number] Upcoming Health Maintenance Date Due INFLUENZA AGE 9 TO ADULT 8/1/2017 DTaP/Tdap/Td series (2 - Td) 3/26/2018 PAP AKA CERVICAL CYTOLOGY 7/6/2019 Allergies as of 5/12/2017  Review Complete On: 5/12/2017 By: No Noonan NP No Known Allergies Current Immunizations  Never Reviewed Name Date DTAP Vaccine 3/26/2008 Hepatitis B Vaccine 8/27/2001, 3/16/2001, 2/16/2001 IPV 4/16/2001, 2/16/2001, 7/23/1990 MMR Vaccine 3/16/2001, 2/16/2001 Varicella Virus Vaccine Live 2/16/2001  
 dT Vaccine 8/27/2001, 2/16/2001 Not reviewed this visit You Were Diagnosed With   
  
 Codes Comments Anxiety disorder, unspecified type    -  Primary ICD-10-CM: F41.9 ICD-9-CM: 300.00 Suprapubic pressure     ICD-10-CM: R10.2 ICD-9-CM: 789.09 Vitals BP Pulse Temp Resp Height(growth percentile) Weight(growth percentile) 113/60 82 96 °F (35.6 °C) (Oral) 18 5' 6\" (1.676 m) 188 lb (85.3 kg) Breastfeeding? BMI OB Status Smoking Status No 30.34 kg/m2 IUD Former Smoker Vitals History BMI and BSA Data Body Mass Index Body Surface Area  
 30.34 kg/m 2 1.99 m 2 Preferred Pharmacy Pharmacy Name Phone [address] [facility] [address] [facility] [phone] Your Updated Medication List  
  
   
This list is accurate as of: 5/12/17  9:27 AM.  Always use your most recent med list.  
  
  
  
  
 ALPRAZolam 0.5 mg tablet Commonly known as:  Toy Moment Take 1 Tab by mouth daily as needed for Anxiety (do not take more than 2 tablets a day. Start with 1/2 tab). buPROPion  mg tablet Commonly known as:  Tarri [place] Take 1 Tab by mouth every morning. MIRENA 20 mcg/24 hr (5 years) IUD Generic drug:  levonorgestrel 1 Each by IntraUTERine route once. Prescriptions Sent to Pharmacy Refills buPROPion XL (WELLBUTRIN XL) 150 mg tablet 0 Sig: Take 1 Tab by mouth every morning. Class: Normal  
 Pharmacy: [facility] [place] [facility] [address] [facility] [address] AT [address] Ph #: [phone] Route: Oral  
  
We Performed the Following AMB POC URINALYSIS DIP STICK AUTO W/O MICRO [20043 CPT(R)] Patient Instructions Bupropion (By mouth) Bupropion ([id number]-ZNDR-wsl-on) Treats depression and aids in quitting smoking. Also prevents depression caused by seasonal affective disorder (SAD). Brand Name(s): Aplenzin, Forfivo XL, Wellbutrin, Wellbutrin SR, Wellbutrin XL, Zyban There may be other brand names for this medicine. When This Medicine Should Not Be Used: This medicine is not right for everyone. Do not use it if you had an allergic reaction to bupropion, or if you have seizures, anorexia, or bulimia. How to Use This Medicine:  
Tablet, Long Acting Tablet · Take your medicine as directed. Your dose may need to be changed several times to find what works best for you. · You may need to take Wellbutrin® for up to 4 weeks before you feel better. You may need to take Zyban® for 1 to 2 weeks before the date that you plan to stop smoking. · Swallow the tablet whole. Do not break, crush, divide, or chew it. · It is best to take Aplenzin® in the morning. · Do not take Wellbutrin® or Zyban® close to bedtime if you have trouble sleeping. · You may take this medicine with or without food. If you have nausea, it may help to take it with food. · If you take the extended-release tablet, part of the tablet may pass into your stools. This is normal and is nothing to worry about. · This medicine should come with a Medication Guide. Ask your pharmacist for a copy if you do not have one. · Missed dose: Skip the missed dose and go back to your regular dosing schedule. Never take extra medicine to make up for a missed dose. · Store the medicine in a closed container at room temperature, away from heat, moisture, and direct light. Drugs and Foods to Avoid: Ask your doctor or pharmacist before using any other medicine, including over-the-counter medicines, vitamins, and herbal products. · Do not use this medicine and an MAO inhibitor (MAOI), such as linezolid or methylene blue injection, within 14 days of each other. Do not use Zyban® to quit smoking if you already take Aplenzin® or Wellbutrin® for depression, because they are the same medicine. · Some medicines can affect how bupropion works. Tell your doctor if you are using the following: ¨ Amantadine, cimetidine, clopidogrel, cyclophosphamide, levodopa, nicotine patch, orphenadrine, tamoxifen, theophylline, thiotepa, ticlopidine ¨ Beta blocker (such as metoprolol), insulin or diabetes medicine that you take by mouth, medicine for heart rhythm problems (propafenone, flecainide), medicine to treat HIV or AIDS (efavirenz, lopinavir, nelfinavir, ritonavir), medicine for seizures (carbamazepine, phenobarbital, phenytoin), other medicine for depression (desipramine, fluoxetine, imipramine, nortriptyline, paroxetine, sertraline), medicine to treat mental illness (haloperidol, risperidone, thioridazine), steroid medicine (hydrocortisone, methylprednisolone, prednisone, prednisolone, dexamethasone), or a blood thinner · Limit alcohol, or do not drink alcohol at all while you are using this medicine. Warnings While Using This Medicine: · Tell your doctor if you are pregnant or breastfeeding, or if you have kidney disease, liver disease, diabetes, glaucoma, heart disease, or high blood pressure. · Tell your doctor if you take barbiturates, benzodiazepines, antiseizure medicine, or sedatives, or if you recently stopped taking them.  Tell your doctor if you have a history of drug addiction, or if you drink alcohol. · For some children, teenagers, and young adults, this medicine may increase mental or emotional problems. This may lead to thoughts of suicide and violence. Talk with your doctor right away if you have any thoughts or behavior changes that concern you. Tell your doctor if you or anyone in your family has a history of bipolar disorder or suicide attempts. · This medicine may cause the following problems: ¨ An increased risk of seizures ¨ Changes in mood or behavior ¨ High blood pressure ¨ Serious skin reactions · This medicine may make you dizzy or drowsy. Do not drive or do anything that could be dangerous until you know how this medicine affects you. · Zyban® is only part of a complete program to help you quit smoking. You may still want to smoke at times. Have a plan to cope with these situations. · Do not stop using this medicine suddenly. Your doctor will need to slowly decrease your dose before you stop it completely. · Tell any doctor or dentist who treats you that you are using this medicine. This medicine may affect certain medical test results. · Your doctor will check your progress and the effects of this medicine at regular visits. Keep all appointments. · Keep all medicine out of the reach of children. Never share your medicine with anyone. Possible Side Effects While Using This Medicine:  
Call your doctor right away if you notice any of these side effects: · Allergic reaction: Itching or hives, swelling in your face or hands, swelling or tingling in your mouth or throat, chest tightness, trouble breathing · Blistering, peeling, or red skin rash · Chest pain, trouble breathing, fast, slow, or uneven heartbeat · Muscle or joint pain, fever with rash · Seeing or hearing things that are not there, feeling like people are against you · Seizures or tremors · Sudden increase in energy, racing thoughts, trouble sleeping · Thoughts of hurting yourself, worsening depression, severe agitation or confusion If you notice these less serious side effects, talk with your doctor: · Dry mouth · Eye pain, vision changes, seeing halos around lights · Headache or dizziness · Nausea, vomiting, constipation, diarrhea, gas, stomach pain · Weight gain or loss If you notice other side effects that you think are caused by this medicine, tell your doctor. Call your doctor for medical advice about side effects. You may report side effects to FDA at 3-277-GVV-6066 © 2017 [address] Information is for End User's use only and may not be sold, redistributed or otherwise used for commercial purposes. The above information is an  only. It is not intended as medical advice for individual conditions or treatments. Talk to your doctor, nurse or pharmacist before following any medical regimen to see if it is safe and effective for you. Introducing [facility] & HEALTH SERVICES! Matt Ferrell introduces "[employer]" patient portal. Now you can access parts of your medical record, email your doctor's office, and request medication refills online. 1. In your internet browser, go to [email or web address] 2. Click on the First Time User? Click Here link in the Sign In box. You will see the New Member Sign Up page. 3. Enter your "[employer]" Access Code exactly as it appears below. You will not need to use this code after youve completed the sign-up process. If you do not sign up before the expiration date, you must request a new code. · "[employer]" Access Code: [email or web address] Expires: 6/14/2017  8:51 AM 
 
4. Enter the last four digits of your Social Security Number (xxxx) and Date of Birth (mm/dd/yyyy) as indicated and click Submit. You will be taken to the next sign-up page. 5. Create a "[employer]" ID.  This will be your "[employer]" login ID and cannot be changed, so think of one that is secure and easy to remember. 6. Create a [employer] password. You can change your password at any time. 7. Enter your Password Reset Question and Answer. This can be used at a later time if you forget your password. 8. Enter your e-mail address. You will receive e-mail notification when new information is available in [address] 9. Click Sign Up. You can now view and download portions of your medical record. 10. Click the Download Summary menu link to download a portable copy of your medical information. If you have questions, please visit the Frequently Asked Questions section of the [employer] website. Remember, [employer] is NOT to be used for urgent needs. For medical emergencies, dial 911. Now available from your iPhone and Android! Please provide this summary of care documentation to your next provider. Your primary care clinician is listed as Santiago Portillo. If you have any questions after today's visit, please call [phone].

## 2017-05-12 NOTE — PROGRESS NOTES
Subjective:      Harpreet De Paz is a 31 y.o. female who presents today for anxiety. Mental Health Review  Patient is seen for anxiety disorder. Since last visit: she states the medication has been keeping her calm but she notes headaches and total decrease in libido. She states her appetite is the same . Ongoing symptoms include: mood swings. She denies: SI/SA. Reported side effects from the treatment: headache. She states she has started taking half of a tablet to help this, she then increased her caffeine intake and noted UTI symptoms. Notation from previous visit: Patient has concerns for anxiety disorder. She states she feels like she has multiple stressors, She has tried running to control symptoms. Ongoing symptoms include: she feels like she is loosing control, easily set off, mood swings, she states this is happening 1-2 times daily where she feels overwhelmed, feels light headed. She denies: SI/SA. PHQ shows moderate depression, dysthmia    UTI symptoms: started last week with an increase in her caffeine. She notes suprapubic pressure. Patient Active Problem List    Diagnosis Date Noted    Anemia 04/26/2017    Obesity, Class I, BMI 30-34.9 03/16/2017     Current Outpatient Prescriptions   Medication Sig Dispense Refill    sertraline (ZOLOFT) 50 mg tablet Take 1 Tab by mouth daily. 30 Tab 2    levonorgestrel (MIRENA) 20 mcg/24 hr (5 years) IUD 1 Each by IntraUTERine route once.  ALPRAZolam (XANAX) 0.5 mg tablet Take 1 Tab by mouth daily as needed for Anxiety (do not take more than 2 tablets a day. Start with 1/2 tab).  20 Tab 0     No Known Allergies  Past Medical History:   Diagnosis Date    Anemia      Family History   Problem Relation Age of Onset    Diabetes Mother      Social History   Substance Use Topics    Smoking status: Former Smoker    Smokeless tobacco: Never Used      Comment: light social smoker    Alcohol use Yes      Comment: socially in moderation Review of Systems    A comprehensive review of systems was negative except for that written in the HPI. Objective:     Visit Vitals    /60    Pulse 82    Temp 96 °F (35.6 °C) (Oral)    Resp 18    Ht 5' 6\" (1.676 m)    Wt 188 lb (85.3 kg)    Breastfeeding No    BMI 30.34 kg/m2     General appearance: alert, cooperative, no distress, appears stated age  Head: Normocephalic, without obvious abnormality, atraumatic  Eyes: negative  Back: symmetric, no curvature. ROM normal. No CVA tenderness. Lungs: clear to auscultation bilaterally  Heart: regular rate and rhythm, S1, S2 normal, no murmur, click, rub or gallop  Abdomen: soft, non-tender. Bowel sounds normal. No masses,  no organomegaly  Extremities: extremities normal, atraumatic, no cyanosis or edema  Neurologic: Grossly normal  Psych: appropriate mood, speech, affect  Nursing note and vitals reviewed  Assessment/Plan:       ICD-10-CM ICD-9-CM    1. Anxiety disorder, unspecified type F41.9 300.00 buPROPion XL (WELLBUTRIN XL) 150 mg tablet   2. Suprapubic pressure R10.2 789.09 AMB POC URINALYSIS DIP STICK AUTO W/O MICRO     UA was negative, changed antidepressant due to the side effects from zoloft, she will call next week for any intolerance to wellbutrin    . Follow-up Disposition:  Return in about 8 weeks (around 7/10/2017) for Anxiety. Advised her to call back or return to office if symptoms worsen/change/persist.  Discussed expected course/resolution/complications of diagnosis in detail with patient. Medication risks/benefits/costs/interactions/alternatives discussed with patient. She was given an after visit summary which includes diagnoses, current medications, & vitals. She expressed understanding with the diagnosis and plan.

## 2017-05-12 NOTE — PATIENT INSTRUCTIONS
Bupropion (By mouth)   Bupropion ([id number])  Treats depression and aids in quitting smoking. Also prevents depression caused by seasonal affective disorder (SAD). Brand Name(s): Aplenzin, Forfivo XL, Wellbutrin, Wellbutrin SR, Wellbutrin XL, Zyban   There may be other brand names for this medicine. When This Medicine Should Not Be Used: This medicine is not right for everyone. Do not use it if you had an allergic reaction to bupropion, or if you have seizures, anorexia, or bulimia. How to Use This Medicine:   Tablet, Long Acting Tablet  · Take your medicine as directed. Your dose may need to be changed several times to find what works best for you. · You may need to take Wellbutrin® for up to 4 weeks before you feel better. You may need to take Zyban® for 1 to 2 weeks before the date that you plan to stop smoking. · Swallow the tablet whole. Do not break, crush, divide, or chew it. · It is best to take Aplenzin® in the morning. · Do not take Wellbutrin® or Zyban® close to bedtime if you have trouble sleeping. · You may take this medicine with or without food. If you have nausea, it may help to take it with food. · If you take the extended-release tablet, part of the tablet may pass into your stools. This is normal and is nothing to worry about. · This medicine should come with a Medication Guide. Ask your pharmacist for a copy if you do not have one. · Missed dose: Skip the missed dose and go back to your regular dosing schedule. Never take extra medicine to make up for a missed dose. · Store the medicine in a closed container at room temperature, away from heat, moisture, and direct light. Drugs and Foods to Avoid:   Ask your doctor or pharmacist before using any other medicine, including over-the-counter medicines, vitamins, and herbal products. · Do not use this medicine and an MAO inhibitor (MAOI), such as linezolid or methylene blue injection, within 14 days of each other.  Do not use Zyban® to quit smoking if you already take Aplenzin® or Wellbutrin® for depression, because they are the same medicine. · Some medicines can affect how bupropion works. Tell your doctor if you are using the following:   ¨ Amantadine, cimetidine, clopidogrel, cyclophosphamide, levodopa, nicotine patch, orphenadrine, tamoxifen, theophylline, thiotepa, ticlopidine  ¨ Beta blocker (such as metoprolol), insulin or diabetes medicine that you take by mouth, medicine for heart rhythm problems (propafenone, flecainide), medicine to treat HIV or AIDS (efavirenz, lopinavir, nelfinavir, ritonavir), medicine for seizures (carbamazepine, phenobarbital, phenytoin), other medicine for depression (desipramine, fluoxetine, imipramine, nortriptyline, paroxetine, sertraline), medicine to treat mental illness (haloperidol, risperidone, thioridazine), steroid medicine (hydrocortisone, methylprednisolone, prednisone, prednisolone, dexamethasone), or a blood thinner  · Limit alcohol, or do not drink alcohol at all while you are using this medicine. Warnings While Using This Medicine:   · Tell your doctor if you are pregnant or breastfeeding, or if you have kidney disease, liver disease, diabetes, glaucoma, heart disease, or high blood pressure. · Tell your doctor if you take barbiturates, benzodiazepines, antiseizure medicine, or sedatives, or if you recently stopped taking them. Tell your doctor if you have a history of drug addiction, or if you drink alcohol. · For some children, teenagers, and young adults, this medicine may increase mental or emotional problems. This may lead to thoughts of suicide and violence. Talk with your doctor right away if you have any thoughts or behavior changes that concern you. Tell your doctor if you or anyone in your family has a history of bipolar disorder or suicide attempts.   · This medicine may cause the following problems:  ¨ An increased risk of seizures  ¨ Changes in mood or behavior  ¨ High blood pressure  ¨ Serious skin reactions  · This medicine may make you dizzy or drowsy. Do not drive or do anything that could be dangerous until you know how this medicine affects you. · Zyban® is only part of a complete program to help you quit smoking. You may still want to smoke at times. Have a plan to cope with these situations. · Do not stop using this medicine suddenly. Your doctor will need to slowly decrease your dose before you stop it completely. · Tell any doctor or dentist who treats you that you are using this medicine. This medicine may affect certain medical test results. · Your doctor will check your progress and the effects of this medicine at regular visits. Keep all appointments. · Keep all medicine out of the reach of children. Never share your medicine with anyone. Possible Side Effects While Using This Medicine:   Call your doctor right away if you notice any of these side effects:  · Allergic reaction: Itching or hives, swelling in your face or hands, swelling or tingling in your mouth or throat, chest tightness, trouble breathing  · Blistering, peeling, or red skin rash  · Chest pain, trouble breathing, fast, slow, or uneven heartbeat  · Muscle or joint pain, fever with rash  · Seeing or hearing things that are not there, feeling like people are against you  · Seizures or tremors  · Sudden increase in energy, racing thoughts, trouble sleeping  · Thoughts of hurting yourself, worsening depression, severe agitation or confusion  If you notice these less serious side effects, talk with your doctor:   · Dry mouth  · Eye pain, vision changes, seeing halos around lights  · Headache or dizziness  · Nausea, vomiting, constipation, diarrhea, gas, stomach pain  · Weight gain or loss  If you notice other side effects that you think are caused by this medicine, tell your doctor. Call your doctor for medical advice about side effects.  You may report side effects to FDA at 8-898-FDA-6925  © 2017 [facility] Information is for End User's use only and may not be sold, redistributed or otherwise used for commercial purposes. The above information is an  only. It is not intended as medical advice for individual conditions or treatments. Talk to your doctor, nurse or pharmacist before following any medical regimen to see if it is safe and effective for you.

## 2017-05-12 NOTE — PROGRESS NOTES
..  Chief Complaint   Patient presents with    Anxiety     1. Have you been to the ER, urgent care clinic since your last visit? Hospitalized since your last visit? No       2. Have you seen or consulted any other health care providers outside of the [address] [place] [address] since your last visit? Include any pap smears or colon screening.  No

## 2017-07-07 ENCOUNTER — OFFICE VISIT (OUTPATIENT)
Dept: INTERNAL MEDICINE CLINIC | Facility: CLINIC | Age: 27
End: 2017-07-07

## 2017-07-07 VITALS
OXYGEN SATURATION: 97 % | HEART RATE: 83 BPM | DIASTOLIC BLOOD PRESSURE: 71 MMHG | WEIGHT: 186 LBS | SYSTOLIC BLOOD PRESSURE: 113 MMHG | BODY MASS INDEX: 29.89 KG/M2 | TEMPERATURE: 97.7 F | HEIGHT: 66 IN | RESPIRATION RATE: 18 BRPM

## 2017-07-07 DIAGNOSIS — J06.9 ACUTE URI: Primary | ICD-10-CM

## 2017-07-07 RX ORDER — ALBUTEROL SULFATE 90 UG/1
1 AEROSOL, METERED RESPIRATORY (INHALATION)
Qty: 1 INHALER | Refills: 0 | Status: SHIPPED | OUTPATIENT
Start: 2017-07-07 | End: 2018-03-12 | Stop reason: ALTCHOICE

## 2017-07-07 RX ORDER — FLUTICASONE PROPIONATE 50 MCG
2 SPRAY, SUSPENSION (ML) NASAL DAILY
Qty: 1 BOTTLE | Refills: 0 | Status: SHIPPED | OUTPATIENT
Start: 2017-07-07 | End: 2018-03-12 | Stop reason: ALTCHOICE

## 2017-07-07 NOTE — PROGRESS NOTES
Chief Complaint   Patient presents with    Cough    Shortness of Breath     3 days      1. Have you been to the ER, urgent care clinic since your last visit? Hospitalized since your last visit? No    2. Have you seen or consulted any other health care providers outside of the [address] [place] [address] since your last visit? Include any pap smears or colon screening.  No

## 2017-07-07 NOTE — MR AVS SNAPSHOT
Visit Information Date & Time Provider Department Dept. Phone Encounter #  
 7/7/2017  4:15 PM Deja Schuler, [facility] [address] Internal Medicine [phone] [id number] Follow-up Instructions Return for Call Monday if no improvement or any worsening. Upcoming Health Maintenance Date Due INFLUENZA AGE 9 TO ADULT 8/1/2017 DTaP/Tdap/Td series (2 - Td) 3/26/2018 PAP AKA CERVICAL CYTOLOGY 7/6/2019 Allergies as of 7/7/2017  Review Complete On: 7/7/2017 By: Deja Schuler NP No Known Allergies Current Immunizations  Never Reviewed Name Date DTAP Vaccine 3/26/2008 Hepatitis B Vaccine 8/27/2001, 3/16/2001, 2/16/2001 IPV 4/16/2001, 2/16/2001, 7/23/1990 MMR Vaccine 3/16/2001, 2/16/2001 Varicella Virus Vaccine Live 2/16/2001  
 dT Vaccine 8/27/2001, 2/16/2001 Not reviewed this visit You Were Diagnosed With   
  
 Codes Comments Acute URI    -  Primary ICD-10-CM: J06.9 ICD-9-CM: 465.9 Vitals BP Pulse Temp Resp Height(growth percentile) Weight(growth percentile) 113/71 83 97.7 °F (36.5 °C) (Oral) 18 5' 6\" (1.676 m) 186 lb (84.4 kg) SpO2 BMI OB Status Smoking Status 97% 30.02 kg/m2 IUD Former Smoker Vitals History BMI and BSA Data Body Mass Index Body Surface Area 30.02 kg/m 2 1.98 m 2 Preferred Pharmacy Pharmacy Name Phone [address] [facility], [place] [phone] Your Updated Medication List  
  
   
This list is accurate as of: 7/7/17  4:33 PM.  Always use your most recent med list.  
  
  
  
  
 albuterol 90 mcg/actuation inhaler Commonly known as:  PROVENTIL HFA, VENTOLIN HFA, PROAIR HFA Take 1 Puff by inhalation every six (6) hours as needed for Wheezing. ALPRAZolam 0.5 mg tablet Commonly known as:  Ruy Chen Take 1 Tab by mouth daily as needed for Anxiety (do not take more than 2 tablets a day. Start with 1/2 tab). buPROPion  mg tablet Commonly known as:  WELLBUTRIN XL  
TAKE 1 TABLET BY MOUTH EVERY MORNING  
  
 fluticasone 50 mcg/actuation nasal spray Commonly known as:  Keily Coffee 2 Sprays by Both Nostrils route daily. guaiFENesin 1,200 mg Ta12 ER tablet Commonly known as:  Jose M & Jose M Take 1 Tab by mouth two (2) times a day. Indications: COLD SYMPTOMS MIRENA 20 mcg/24 hr (5 years) IUD Generic drug:  levonorgestrel 1 Each by IntraUTERine route once. Prescriptions Sent to Pharmacy Refills  
 albuterol (PROVENTIL HFA, VENTOLIN HFA, PROAIR HFA) 90 mcg/actuation inhaler 0 Sig: Take 1 Puff by inhalation every six (6) hours as needed for Wheezing. Class: Normal  
 Pharmacy: [facility], [address] [place] [address] AT [address] [place] [address] Ph #: [phone] Route: Inhalation  
 guaiFENesin (MUCINEX) 1,200 mg Ta12 ER tablet 0 Sig: Take 1 Tab by mouth two (2) times a day. Indications: COLD SYMPTOMS Class: Normal  
 Pharmacy: [facility] [address] [facility] [address] [place] [address] AT [address] [place] [address] Ph #: [phone] Route: Oral  
 fluticasone (FLONASE) 50 mcg/actuation nasal spray 0 Si Sprays by Both Nostrils route daily. Class: Normal  
 Pharmacy: [facility], [address] [place] [address] AT [address] [place] [address] Ph #: [phone] Route: Both Nostrils Follow-up Instructions Return for Call Monday if no improvement or any worsening. Patient Instructions Viral Respiratory Infection: Care Instructions Your Care Instructions Viruses are very small organisms. They grow in number after they enter your body. There are many types that cause different illnesses, such as colds and the mumps. The symptoms of a viral respiratory infection often start quickly. They include a fever, sore throat, and runny nose. You may also just not feel well. Or you may not want to eat much. Most viral respiratory infections are not serious. They usually get better with time and self-care. Antibiotics are not used to treat a viral infection. That's because antibiotics will not help cure a viral illness. In some cases, antiviral medicine can help your body fight a serious viral infection. Follow-up care is a key part of your treatment and safety. Be sure to make and go to all appointments, and call your doctor if you are having problems. It's also a good idea to know your test results and keep a list of the medicines you take. How can you care for yourself at home? · Rest as much as possible until you feel better. · Be safe with medicines. Take your medicine exactly as prescribed. Call your doctor if you think you are having a problem with your medicine. You will get more details on the specific medicine your doctor prescribes. · Take an over-the-counter pain medicine, such as acetaminophen (Tylenol), ibuprofen (Advil, Motrin), or naproxen (Aleve), as needed for pain and fever. Read and follow all instructions on the label. Do not give aspirin to anyone younger than 20. It has been linked to Reye syndrome, a serious illness. · Drink plenty of fluids, enough so that your urine is light yellow or clear like water. Hot fluids, such as tea or soup, may help relieve congestion in your nose and throat. If you have kidney, heart, or liver disease and have to limit fluids, talk with your doctor before you increase the amount of fluids you drink. · Try to clear mucus from your lungs by breathing deeply and coughing. · Gargle with warm salt water once an hour. This can help reduce swelling and throat pain. Use 1 teaspoon of salt mixed in 1 cup of warm water. · Do not smoke or allow others to smoke around you. If you need help quitting, talk to your doctor about stop-smoking programs and medicines. These can increase your chances of quitting for good. To avoid spreading the virus · Cough or sneeze into a tissue. Then throw the tissue away. · If you don't have a tissue, use your hand to cover your cough or sneeze. Then clean your hand. You can also cough into your sleeve. · Wash your hands often. Use soap and warm water. Wash for 15 to 20 seconds each time. · If you don't have soap and water near you, you can clean your hands with alcohol wipes or gel. When should you call for help? Call your doctor now or seek immediate medical care if: 
· You have a new or higher fever. · Your fever lasts more than 48 hours. · You have trouble breathing. · You have a fever with a stiff neck or a severe headache. · You are sensitive to light. · You feel very sleepy or confused. Watch closely for changes in your health, and be sure to contact your doctor if: 
· You do not get better as expected. Where can you learn more? Go to http://jonnathan-marcel.info/. Enter N480 in the search box to learn more about \"Viral Respiratory Infection: Care Instructions. \" Current as of: March 25, 2017 Content Version: 11.3 © 2669-3567 VR1. Care instructions adapted under license by [employer] (which disclaims liability or warranty for this information). If you have questions about a medical condition or this instruction, always ask your healthcare professional. [place] [address] any warranty or liability for your use of this information. Introducing [facility]! Dear Leopoldo Freeze: 
Thank you for requesting a Izzui account. Our records indicate that you already have an active Izzui account. You can access your account anytime at [email or web address] Did you know that you can access your hospital and ER discharge instructions at any time in Operax? You can also review all of your test results from your hospital stay or ER visit. Additional Information If you have questions, please visit the Frequently Asked Questions section of the Operax website at [email or web address] Remember, Operax is NOT to be used for urgent needs. For medical emergencies, dial 911. Now available from your iPhone and Android! Please provide this summary of care documentation to your next provider. Your primary care clinician is listed as Anel Noel. If you have any questions after today's visit, please call [phone].

## 2017-07-07 NOTE — PROGRESS NOTES
Subjective:      Alonzo Noonan is a 31 y.o. female who presents today for acute visit. URI Review  Marcus Hyde returns to clinic today to talk about: URI symptoms for 3 days, which are changing since that time. She also reports tickle in throat causing dry  cough, ear pressure, SOB. She denies a history of: nausea, vomiting, sinus pain. Treatments have included: advil. Relevant PMH: No pertinent additional PMH. Patient reports sick contacts: yes - student with croup. She rates discomfort at 6/10. Patient Active Problem List    Diagnosis Date Noted    Anemia 04/26/2017    Obesity, Class I, BMI 30-34.9 03/16/2017     Current Outpatient Prescriptions   Medication Sig Dispense Refill    buPROPion XL (WELLBUTRIN XL) 150 mg tablet TAKE 1 TABLET BY MOUTH EVERY MORNING 30 Tab 5    ALPRAZolam (XANAX) 0.5 mg tablet Take 1 Tab by mouth daily as needed for Anxiety (do not take more than 2 tablets a day. Start with 1/2 tab). 20 Tab 0    levonorgestrel (MIRENA) 20 mcg/24 hr (5 years) IUD 1 Each by IntraUTERine route once. No Known Allergies  Past Medical History:   Diagnosis Date    Anemia      Family History   Problem Relation Age of Onset    Diabetes Mother      Social History   Substance Use Topics    Smoking status: Former Smoker    Smokeless tobacco: Never Used      Comment: light social smoker    Alcohol use Yes      Comment: socially in moderation        Review of Systems    A comprehensive review of systems was negative except for that written in the HPI.      Objective:     Visit Vitals    /71    Pulse 83    Temp 97.7 °F (36.5 °C) (Oral)    Resp 18    Ht 5' 6\" (1.676 m)    Wt 186 lb (84.4 kg)    SpO2 97%    BMI 30.02 kg/m2     General appearance: alert, cooperative, no distress, appears stated age  Head: Normocephalic, without obvious abnormality, atraumatic  Eyes: negative  Ears: abnormal TM AD - serous middle ear fluid, abnormal TM AS - serous middle ear fluid  Nose: turbinates edematous, inflamed, no sinus tenderness  Throat: Lips, mucosa, and tongue normal. Teeth and gums normal  Neck: supple, symmetrical, trachea midline and mild anterior cervical adenopathy  Lungs: clear to auscultation bilaterally  Heart: regular rate and rhythm, S1, S2 normal, no murmur, click, rub or gallop  Extremities: extremities normal, atraumatic, no cyanosis or edema  Neurologic: Grossly normal  Nursing note and vitals reviewed  Assessment/Plan:       ICD-10-CM ICD-9-CM    1. Acute URI J06.9 465.9 albuterol (PROVENTIL HFA, VENTOLIN HFA, PROAIR HFA) 90 mcg/actuation inhaler      guaiFENesin (MUCINEX) 1,200 mg Ta12 ER tablet      fluticasone (FLONASE) 50 mcg/actuation nasal spray     Follow-up Disposition:  Return for Call Monday if no improvement or any worsening. Advised her to call back or return to office if symptoms worsen/change/persist.  Discussed expected course/resolution/complications of diagnosis in detail with patient. Medication risks/benefits/costs/interactions/alternatives discussed with patient. She was given an after visit summary which includes diagnoses, current medications, & vitals. She expressed understanding with the diagnosis and plan.

## 2017-07-07 NOTE — PATIENT INSTRUCTIONS
Viral Respiratory Infection: Care Instructions  Your Care Instructions  Viruses are very small organisms. They grow in number after they enter your body. There are many types that cause different illnesses, such as colds and the mumps. The symptoms of a viral respiratory infection often start quickly. They include a fever, sore throat, and runny nose. You may also just not feel well. Or you may not want to eat much. Most viral respiratory infections are not serious. They usually get better with time and self-care. Antibiotics are not used to treat a viral infection. That's because antibiotics will not help cure a viral illness. In some cases, antiviral medicine can help your body fight a serious viral infection. Follow-up care is a key part of your treatment and safety. Be sure to make and go to all appointments, and call your doctor if you are having problems. It's also a good idea to know your test results and keep a list of the medicines you take. How can you care for yourself at home? · Rest as much as possible until you feel better. · Be safe with medicines. Take your medicine exactly as prescribed. Call your doctor if you think you are having a problem with your medicine. You will get more details on the specific medicine your doctor prescribes. · Take an over-the-counter pain medicine, such as acetaminophen (Tylenol), ibuprofen (Advil, Motrin), or naproxen (Aleve), as needed for pain and fever. Read and follow all instructions on the label. Do not give aspirin to anyone younger than 20. It has been linked to Reye syndrome, a serious illness. · Drink plenty of fluids, enough so that your urine is light yellow or clear like water. Hot fluids, such as tea or soup, may help relieve congestion in your nose and throat. If you have kidney, heart, or liver disease and have to limit fluids, talk with your doctor before you increase the amount of fluids you drink.   · Try to clear mucus from your lungs by breathing deeply and coughing. · Gargle with warm salt water once an hour. This can help reduce swelling and throat pain. Use 1 teaspoon of salt mixed in 1 cup of warm water. · Do not smoke or allow others to smoke around you. If you need help quitting, talk to your doctor about stop-smoking programs and medicines. These can increase your chances of quitting for good. To avoid spreading the virus  · Cough or sneeze into a tissue. Then throw the tissue away. · If you don't have a tissue, use your hand to cover your cough or sneeze. Then clean your hand. You can also cough into your sleeve. · Wash your hands often. Use soap and warm water. Wash for 15 to 20 seconds each time. · If you don't have soap and water near you, you can clean your hands with alcohol wipes or gel. When should you call for help? Call your doctor now or seek immediate medical care if:  · You have a new or higher fever. · Your fever lasts more than 48 hours. · You have trouble breathing. · You have a fever with a stiff neck or a severe headache. · You are sensitive to light. · You feel very sleepy or confused. Watch closely for changes in your health, and be sure to contact your doctor if:  · You do not get better as expected. Where can you learn more? Go to http://jonnathan-marcel.info/. Enter J559 in the search box to learn more about \"Viral Respiratory Infection: Care Instructions. \"  Current as of: March 25, 2017  Content Version: 11.3  © 4899-8641 [employer]. Care instructions adapted under license by [employer] (which disclaims liability or warranty for this information). If you have questions about a medical condition or this instruction, always ask your healthcare professional. [address] any warranty or liability for your use of this information.

## 2018-02-05 ENCOUNTER — OFFICE VISIT (OUTPATIENT)
Dept: INTERNAL MEDICINE CLINIC | Facility: CLINIC | Age: 28
End: 2018-02-05

## 2018-02-05 VITALS
HEIGHT: 66 IN | HEART RATE: 102 BPM | OXYGEN SATURATION: 100 % | DIASTOLIC BLOOD PRESSURE: 63 MMHG | RESPIRATION RATE: 18 BRPM | SYSTOLIC BLOOD PRESSURE: 113 MMHG | TEMPERATURE: 97.5 F | WEIGHT: 195 LBS | BODY MASS INDEX: 31.34 KG/M2

## 2018-02-05 DIAGNOSIS — J01.91 ACUTE RECURRENT SINUSITIS, UNSPECIFIED LOCATION: Primary | ICD-10-CM

## 2018-02-05 DIAGNOSIS — R50.9 FEVER, UNSPECIFIED FEVER CAUSE: ICD-10-CM

## 2018-02-05 LAB
FLUAV+FLUBV AG NOSE QL IA.RAPID: NEGATIVE POS/NEG
FLUAV+FLUBV AG NOSE QL IA.RAPID: NEGATIVE POS/NEG
VALID INTERNAL CONTROL?: YES

## 2018-02-05 RX ORDER — AMOXICILLIN 875 MG/1
875 TABLET, FILM COATED ORAL 2 TIMES DAILY
Qty: 14 TAB | Refills: 0 | Status: SHIPPED | OUTPATIENT
Start: 2018-02-05 | End: 2018-03-12 | Stop reason: ALTCHOICE

## 2018-02-05 NOTE — LETTER
2/5/2018 9:00 AM 
 
Ms. Anu Dai [address] [phone] To Whom It May Concern, Please excuse Anu Dai from work for medical reasons 02/05/18 through 2/7/18 Anu Dai is cleared to return to work on: 2/8/18. Sincerely, Zoraida Edmondson PA-C

## 2018-02-05 NOTE — PROGRESS NOTES
HISTORY OF PRESENT ILLNESS  Trinda Phoenix is a 28 y.o. female. Chief Complaint   Patient presents with    Nasal Congestion     congestion, cough x 1 week. Awoke this am with low grade temp. 101. Facial pan/headache. SOB     HPI  1) Headache, nasal congestion, cough, ear pressure x 1 week. Was starting to get better over the weekend, and now symptoms are much worse. Fever this morning. No body aches. Review of Systems   Constitutional: Positive for malaise/fatigue. Negative for fever. HENT: Positive for congestion. Negative for ear pain. Respiratory: Positive for cough and sputum production. Negative for shortness of breath. Neurological: Positive for headaches. Endo/Heme/Allergies: Negative for environmental allergies. Physical Exam   Constitutional: She is oriented to person, place, and time. She appears well-developed and well-nourished. No distress. HENT:   Head: Normocephalic and atraumatic. Mouth/Throat: Oropharynx is clear and moist.   Bilateral TMs with fluid. No erythema. Nasal mucosa red, inflamed. Eyes: Conjunctivae are normal.   Neck: Neck supple. Cardiovascular: Normal rate, regular rhythm and normal heart sounds. Pulmonary/Chest: Effort normal and breath sounds normal.   Lymphadenopathy:     She has no cervical adenopathy. Neurological: She is alert and oriented to person, place, and time. Skin: Skin is warm and dry. Nursing note and vitals reviewed. ASSESSMENT and PLAN    ICD-10-CM ICD-9-CM    1. Acute recurrent sinusitis, unspecified location J01.91 461.9 amoxicillin (AMOXIL) 875 mg tablet   2. Fever, unspecified fever cause R50.9 780.60 AMB POC ANTOINE INFLUENZA A/B TEST negative.

## 2018-02-05 NOTE — PROGRESS NOTES
Chief Complaint   Patient presents with    Nasal Congestion     congestion, cough x 1 week. Awoke this am with low grade temp. 101. Facial pan/headache. SOB     1. Have you been to the ER, urgent care clinic since your last visit? Hospitalized since your last visit? No    2. Have you seen or consulted any other health care providers outside of the [address] [place] [address] since your last visit? Include any pap smears or colon screening.  No

## 2018-02-05 NOTE — LETTER
2/5/2018 9:15 AM 
 
Ms. Brittney Askew [address] [phone] To Whom It May Concern, Please excuse Brittney Askew from work for medical reasons 02/05/18 through 2/6/18. She is contagious. Brittney Askew is cleared to return to work on: 2/7/18. Sincerely, Robi Rao PA-C

## 2018-03-12 ENCOUNTER — OFFICE VISIT (OUTPATIENT)
Dept: INTERNAL MEDICINE CLINIC | Facility: CLINIC | Age: 28
End: 2018-03-12

## 2018-03-12 VITALS
HEART RATE: 85 BPM | RESPIRATION RATE: 18 BRPM | SYSTOLIC BLOOD PRESSURE: 121 MMHG | WEIGHT: 194 LBS | TEMPERATURE: 97.3 F | BODY MASS INDEX: 31.18 KG/M2 | DIASTOLIC BLOOD PRESSURE: 62 MMHG | HEIGHT: 66 IN

## 2018-03-12 DIAGNOSIS — D64.9 ANEMIA, UNSPECIFIED TYPE: Primary | ICD-10-CM

## 2018-03-12 DIAGNOSIS — L30.9 ECZEMA, UNSPECIFIED TYPE: ICD-10-CM

## 2018-03-12 DIAGNOSIS — E16.2 HYPOGLYCEMIA: ICD-10-CM

## 2018-03-12 RX ORDER — TRIAMCINOLONE ACETONIDE 1 MG/G
OINTMENT TOPICAL 2 TIMES DAILY
Qty: 30 G | Refills: 0 | Status: SHIPPED | OUTPATIENT
Start: 2018-03-12 | End: 2018-04-12 | Stop reason: SDUPTHER

## 2018-03-12 NOTE — PROGRESS NOTES
Subjective:      Stacie Troy is a 28 y.o. female who presents today for several concerns. Left knee pain: Symptoms started Friday, she rates pain 5/10. She has been using a brace and states it helped. It was swollen but it has gotten better. She believes she hurt herself while at the gym. She has been using ice and heat. She is not using any medications. Overall symptoms are improving. Dizziness: she states she believes she has low blood sugar, she is having dizziness, vision changes, and feels like her heart is beating \"faster\". She states this happened while she was pregnant, she states her glucose would drop and she would be diaphoretic. She fixed this by eating on a schedule. She has recently cut out red meat from her diet, she denies any medicationchanges, supplements, etc. She states it is not possible that she is pregnant. She currently does not have symptoms but she is fasting. Eczema: she notes eczema to her chest, she has used a topical steroid to treat this in the past.     Patient Active Problem List    Diagnosis Date Noted    Anemia 04/26/2017    Obesity, Class I, BMI 30-34.9 03/16/2017     Current Outpatient Prescriptions   Medication Sig Dispense Refill    triamcinolone acetonide (KENALOG) 0.1 % ointment Apply  to affected area two (2) times a day. use thin layer 30 g 0    levonorgestrel (MIRENA) 20 mcg/24 hr (5 years) IUD 1 Each by IntraUTERine route once. No Known Allergies  Past Medical History:   Diagnosis Date    Anemia      Family History   Problem Relation Age of Onset    Diabetes Mother      Social History   Substance Use Topics    Smoking status: Former Smoker    Smokeless tobacco: Never Used      Comment: light social smoker    Alcohol use Yes      Comment: socially in moderation        Review of Systems    A comprehensive review of systems was negative except for that written in the HPI.      Objective:     Visit Vitals    /62    Pulse 85    Temp 97.3 °F (36.3 °C) (Oral)    Resp 18    Ht 5' 6\" (1.676 m)    Wt 194 lb (88 kg)    Breastfeeding No    BMI 31.31 kg/m2     General appearance: alert, cooperative, no distress, appears stated age  Head: Normocephalic, without obvious abnormality, atraumatic  Eyes: conjunctivae/corneas clear. PERRL, EOM's intact. Back: symmetric, no curvature. ROM normal. No CVA tenderness. Lungs: clear to auscultation bilaterally  Heart: regular rate and rhythm, S1, S2 normal, no murmur, click, rub or gallop  Extremities: left knee with pain with palpation to popliteal, no patellar tenderness. FROM, no crepitus. Pulses: 2+ and symmetric  Skin: dry skin with erythema noted to anterior chest, excoriation marks  Neurologic: Alert and oriented X 3, normal strength and tone. Normal symmetric reflexes. Normal coordination and gait  Nursing note and vitals reviewed  Assessment/Plan:       ICD-10-CM ICD-9-CM    1. Anemia, unspecified type D64.9 285.9 ANEMIA PROFILE A   2. Hypoglycemia Q02.1 080.8 METABOLIC PANEL, COMPREHENSIVE   3. Eczema, unspecified type L30.9 692.9 triamcinolone acetonide (KENALOG) 0.1 % ointment   Will get labs and look at fasting glucose, anemia panel. Follow-up Disposition:  Return for Schedule your annual physical with fasting labs next month . Advised her to call back or return to office if symptoms worsen/change/persist.  Discussed expected course/resolution/complications of diagnosis in detail with patient. Medication risks/benefits/costs/interactions/alternatives discussed with patient. She was given an after visit summary which includes diagnoses, current medications, & vitals. She expressed understanding with the diagnosis and plan.

## 2018-03-12 NOTE — PROGRESS NOTES
Chief Complaint   Patient presents with    Dry Skin    Low Blood Sugar     dizzy, fast heart beat, blurry vision    Knee Pain     Left knee     1. Have you been to the ER, urgent care clinic since your last visit? Hospitalized since your last visit? No    2. Have you seen or consulted any other health care providers outside of the [address] [place] [address] since your last visit? Include any pap smears or colon screening.  No

## 2018-03-13 LAB
ALBUMIN SERPL-MCNC: 4.7 G/DL (ref 3.5–5.5)
ALBUMIN/GLOB SERPL: 1.7 {RATIO} (ref 1.2–2.2)
ALP SERPL-CCNC: 63 IU/L (ref 39–117)
ALT SERPL-CCNC: 18 IU/L (ref 0–32)
AST SERPL-CCNC: 15 IU/L (ref 0–40)
BASOPHILS # BLD AUTO: 0 X10E3/UL (ref 0–0.2)
BASOPHILS NFR BLD AUTO: 0 %
BILIRUB SERPL-MCNC: 0.3 MG/DL (ref 0–1.2)
BUN SERPL-MCNC: 13 MG/DL (ref 6–20)
BUN/CREAT SERPL: 30 (ref 9–23)
CALCIUM SERPL-MCNC: 9.5 MG/DL (ref 8.7–10.2)
CHLORIDE SERPL-SCNC: 101 MMOL/L (ref 96–106)
CO2 SERPL-SCNC: 23 MMOL/L (ref 18–29)
CREAT SERPL-MCNC: 0.44 MG/DL (ref 0.57–1)
EOSINOPHIL # BLD AUTO: 0.3 X10E3/UL (ref 0–0.4)
EOSINOPHIL NFR BLD AUTO: 3 %
ERYTHROCYTE [DISTWIDTH] IN BLOOD BY AUTOMATED COUNT: 16 % (ref 12.3–15.4)
GFR SERPLBLD CREATININE-BSD FMLA CKD-EPI: 138 ML/MIN/1.73
GFR SERPLBLD CREATININE-BSD FMLA CKD-EPI: 159 ML/MIN/1.73
GLOBULIN SER CALC-MCNC: 2.7 G/DL (ref 1.5–4.5)
GLUCOSE SERPL-MCNC: 92 MG/DL (ref 65–99)
HCT VFR BLD AUTO: 41 % (ref 34–46.6)
HGB BLD-MCNC: 12.4 G/DL (ref 11.1–15.9)
IMM GRANULOCYTES # BLD: 0 X10E3/UL (ref 0–0.1)
IMM GRANULOCYTES NFR BLD: 0 %
IRON SATN MFR SERPL: 22 % (ref 15–55)
IRON SERPL-MCNC: 74 UG/DL (ref 27–159)
LYMPHOCYTES # BLD AUTO: 3.2 X10E3/UL (ref 0.7–3.1)
LYMPHOCYTES NFR BLD AUTO: 40 %
MCH RBC QN AUTO: 22.5 PG (ref 26.6–33)
MCHC RBC AUTO-ENTMCNC: 30.2 G/DL (ref 31.5–35.7)
MCV RBC AUTO: 75 FL (ref 79–97)
MONOCYTES # BLD AUTO: 0.5 X10E3/UL (ref 0.1–0.9)
MONOCYTES NFR BLD AUTO: 7 %
NEUTROPHILS # BLD AUTO: 3.9 X10E3/UL (ref 1.4–7)
NEUTROPHILS NFR BLD AUTO: 50 %
PLATELET # BLD AUTO: 274 X10E3/UL (ref 150–379)
POTASSIUM SERPL-SCNC: 4.6 MMOL/L (ref 3.5–5.2)
PROT SERPL-MCNC: 7.4 G/DL (ref 6–8.5)
RBC # BLD AUTO: 5.5 X10E6/UL (ref 3.77–5.28)
RETICS/RBC NFR AUTO: 0.9 % (ref 0.6–2.6)
SODIUM SERPL-SCNC: 142 MMOL/L (ref 134–144)
TIBC SERPL-MCNC: 334 UG/DL (ref 250–450)
UIBC SERPL-MCNC: 260 UG/DL (ref 131–425)
WBC # BLD AUTO: 7.9 X10E3/UL (ref 3.4–10.8)

## 2018-03-13 NOTE — PROGRESS NOTES
Per NP Skiff, called [facility] to add Vitamin B12 and Folate to previous lab order with Dx code of D64.9.  Mica Bauman LPN

## 2018-03-15 DIAGNOSIS — D50.9 IRON DEFICIENCY ANEMIA, UNSPECIFIED IRON DEFICIENCY ANEMIA TYPE: Primary | ICD-10-CM

## 2018-03-15 LAB
FOLATE SERPL-MCNC: 13.9 NG/ML
SPECIMEN STATUS REPORT, ROLRST: NORMAL
VIT B12 SERPL-MCNC: 567 PG/ML (ref 232–1245)

## 2018-03-15 RX ORDER — LANOLIN ALCOHOL/MO/W.PET/CERES
325 CREAM (GRAM) TOPICAL
Qty: 90 TAB | Refills: 3 | Status: SHIPPED | OUTPATIENT
Start: 2018-03-15 | End: 2019-10-10

## 2018-04-12 ENCOUNTER — OFFICE VISIT (OUTPATIENT)
Dept: INTERNAL MEDICINE CLINIC | Facility: CLINIC | Age: 28
End: 2018-04-12

## 2018-04-12 VITALS
RESPIRATION RATE: 18 BRPM | HEART RATE: 76 BPM | HEIGHT: 66 IN | SYSTOLIC BLOOD PRESSURE: 113 MMHG | WEIGHT: 194 LBS | TEMPERATURE: 97.8 F | DIASTOLIC BLOOD PRESSURE: 74 MMHG | BODY MASS INDEX: 31.18 KG/M2

## 2018-04-12 DIAGNOSIS — G43.109 MIGRAINE WITH AURA AND WITHOUT STATUS MIGRAINOSUS, NOT INTRACTABLE: Primary | ICD-10-CM

## 2018-04-12 DIAGNOSIS — L30.9 ECZEMA, UNSPECIFIED TYPE: ICD-10-CM

## 2018-04-12 RX ORDER — SUMATRIPTAN 50 MG/1
50 TABLET, FILM COATED ORAL
Qty: 30 TAB | Refills: 0 | Status: SHIPPED | OUTPATIENT
Start: 2018-04-12 | End: 2021-05-10

## 2018-04-12 RX ORDER — TRIAMCINOLONE ACETONIDE 1 MG/G
OINTMENT TOPICAL
Qty: 30 G | Refills: 0 | Status: SHIPPED | OUTPATIENT
Start: 2018-04-12 | End: 2018-04-27 | Stop reason: SDUPTHER

## 2018-04-12 NOTE — PATIENT INSTRUCTIONS
Migraine Headache: Care Instructions  Your Care Instructions  Migraines are painful, throbbing headaches that often start on one side of the head. They may cause nausea and vomiting and make you sensitive to light, sound, or smell. Without treatment, migraines can last from 4 hours to a few days. Medicines can help prevent migraines or stop them after they have started. Your doctor can help you find which ones work best for you. Follow-up care is a key part of your treatment and safety. Be sure to make and go to all appointments, and call your doctor if you are having problems. It's also a good idea to know your test results and keep a list of the medicines you take. How can you care for yourself at home? · Do not drive if you have taken a prescription pain medicine. · Rest in a quiet, dark room until your headache is gone. Close your eyes, and try to relax or go to sleep. Don't watch TV or read. · Put a cold, moist cloth or cold pack on the painful area for 10 to 20 minutes at a time. Put a thin cloth between the cold pack and your skin. · Use a warm, moist towel or a heating pad set on low to relax tight shoulder and neck muscles. · Have someone gently massage your neck and shoulders. · Take your medicines exactly as prescribed. Call your doctor if you think you are having a problem with your medicine. You will get more details on the specific medicines your doctor prescribes. · Be careful not to take pain medicine more often than the instructions allow. You could get worse or more frequent headaches when the medicine wears off. To prevent migraines  · Keep a headache diary so you can figure out what triggers your headaches. Avoiding triggers may help you prevent headaches. Record when each headache began, how long it lasted, and what the pain was like.  (Was it throbbing, aching, stabbing, or dull?) Write down any other symptoms you had with the headache, such as nausea, flashing lights or dark spots, or sensitivity to bright light or loud noise. Note if the headache occurred near your period. List anything that might have triggered the headache. Triggers may include certain foods (chocolate, cheese, wine) or odors, smoke, bright light, stress, or lack of sleep. · If your doctor has prescribed medicine for your migraines, take it as directed. You may have medicine that you take only when you get a migraine and medicine that you take all the time to help prevent migraines. ¨ If your doctor has prescribed medicine for when you get a headache, take it at the first sign of a migraine, unless your doctor has given you other instructions. ¨ If your doctor has prescribed medicine to prevent migraines, take it exactly as prescribed. Call your doctor if you think you are having a problem with your medicine. · Find healthy ways to deal with stress. Migraines are most common during or right after stressful times. Take time to relax before and after you do something that has caused a migraine in the past.  · Try to keep your muscles relaxed by keeping good posture. Check your jaw, face, neck, and shoulder muscles for tension. Try to relax them. When you sit at a desk, change positions often. And make sure to stretch for 30 seconds each hour. · Get plenty of sleep and exercise. · Eat meals on a regular schedule. Avoid foods and drinks that often trigger migraines. These include chocolate, alcohol (especially red wine and port), aspartame, monosodium glutamate (MSG), and some additives found in foods (such as hot dogs, bianchi, cold cuts, aged cheeses, and pickled foods). · Limit caffeine. Don't drink too much coffee, tea, or soda. But don't quit caffeine suddenly. That can also give you migraines. · Do not smoke or allow others to smoke around you. If you need help quitting, talk to your doctor about stop-smoking programs and medicines. These can increase your chances of quitting for good.   · If you are taking birth control pills or hormone therapy, talk to your doctor about whether they are triggering your migraines. When should you call for help? Call 911 anytime you think you may need emergency care. For example, call if:  ? · You have signs of a stroke. These may include:  ¨ Sudden numbness, paralysis, or weakness in your face, arm, or leg, especially on only one side of your body. ¨ Sudden vision changes. ¨ Sudden trouble speaking. ¨ Sudden confusion or trouble understanding simple statements. ¨ Sudden problems with walking or balance. ¨ A sudden, severe headache that is different from past headaches. ?Call your doctor now or seek immediate medical care if:  ? · You have new or worse nausea and vomiting. ? · You have a new or higher fever. ? · Your headache gets much worse. ? Watch closely for changes in your health, and be sure to contact your doctor if:  ? · You are not getting better after 2 days (48 hours). Where can you learn more? Go to http://jonnathan-marcel.info/. Enter P836 in the search box to learn more about \"Migraine Headache: Care Instructions. \"  Current as of: October 14, 2016  Content Version: 11.4  © 2555-9421 [employer]. Care instructions adapted under license by [employer] (which disclaims liability or warranty for this information). If you have questions about a medical condition or this instruction, always ask your healthcare professional. [address] [place] [address] any warranty or liability for your use of this information.

## 2018-04-12 NOTE — PROGRESS NOTES
Chief Complaint   Patient presents with    Headache     Consistant     1. Have you been to the ER, urgent care clinic since your last visit? Hospitalized since your last visit? No    2. Have you seen or consulted any other health care providers outside of the [facility] since your last visit? Include any pap smears or colon screening.  No

## 2018-04-12 NOTE — MR AVS SNAPSHOT
[address] [place] [address] 
[phone] Patient: Kaitlin Fleming MRN: [id number] :[id number] Visit Information Date & Time Provider Department Dept. Phone Encounter #  
 2018 10:30 AM Lonell , NP [facility] [phone] [id number] Follow-up Instructions Return if symptoms worsen or fail to improve. Upcoming Health Maintenance Date Due DTaP/Tdap/Td series (2 - Tdap) 3/26/2018 PAP AKA CERVICAL CYTOLOGY 2019 Allergies as of 2018  Review Complete On: 2018 By: Lonell , NP No Known Allergies Current Immunizations  Never Reviewed Name Date DTAP Vaccine 3/26/2008 Hepatitis B Vaccine 2001, 3/16/2001, 2001 IPV 2001, 2001, 1990 MMR Vaccine 3/16/2001, 2001 Varicella Virus Vaccine Live 2001  
 dT Vaccine 2001, 2001 Not reviewed this visit You Were Diagnosed With   
  
 Codes Comments Migraine with aura and without status migrainosus, not intractable    -  Primary ICD-10-CM: G43.109 ICD-9-CM: 346.00 Vitals BP Pulse Temp Resp Height(growth percentile) Weight(growth percentile) 113/74 76 97.8 °F (36.6 °C) (Oral) 18 5' 6\" (1.676 m) 194 lb (88 kg) Breastfeeding? BMI OB Status Smoking Status No 31.31 kg/m2 IUD Former Smoker Vitals History BMI and BSA Data Body Mass Index Body Surface Area  
 31.31 kg/m 2 2.02 m 2 Preferred Pharmacy Pharmacy Name Phone [facility] [address] [place] [address] 438, [address] [facility] [phone] Your Updated Medication List  
  
   
This list is accurate as of 18 10:55 AM.  Always use your most recent med list.  
  
  
  
  
 ferrous sulfate 325 mg (65 mg iron) tablet Take 1 Tab by mouth Daily (before breakfast). MIRENA 20 mcg/24 hr (5 years) Iud  
Generic drug:  levonorgestrel 1 Each by IntraUTERine route once. SUMAtriptan 50 mg tablet Commonly known as:  IMITREX Take 1 Tab by mouth once as needed for Migraine for up to 1 dose. triamcinolone acetonide 0.1 % ointment Commonly known as:  KENALOG  
APPLY TO THE AFFECTED AREA TWICE DAILY Prescriptions Sent to Pharmacy Refills SUMAtriptan (IMITREX) 50 mg tablet 0 Sig: Take 1 Tab by mouth once as needed for Migraine for up to 1 dose. Class: Normal  
 Pharmacy: [address] [place] [address] RD AT [address] [facility] #: [phone] Route: Oral  
  
Follow-up Instructions Return if symptoms worsen or fail to improve. Patient Instructions Migraine Headache: Care Instructions Your Care Instructions Migraines are painful, throbbing headaches that often start on one side of the head. They may cause nausea and vomiting and make you sensitive to light, sound, or smell. Without treatment, migraines can last from 4 hours to a few days. Medicines can help prevent migraines or stop them after they have started. Your doctor can help you find which ones work best for you. Follow-up care is a key part of your treatment and safety. Be sure to make and go to all appointments, and call your doctor if you are having problems. It's also a good idea to know your test results and keep a list of the medicines you take. How can you care for yourself at home? · Do not drive if you have taken a prescription pain medicine. · Rest in a quiet, dark room until your headache is gone. Close your eyes, and try to relax or go to sleep. Don't watch TV or read. · Put a cold, moist cloth or cold pack on the painful area for 10 to 20 minutes at a time. Put a thin cloth between the cold pack and your skin.  
· Use a warm, moist towel or a heating pad set on low to relax tight shoulder and neck muscles. · Have someone gently massage your neck and shoulders. · Take your medicines exactly as prescribed. Call your doctor if you think you are having a problem with your medicine. You will get more details on the specific medicines your doctor prescribes. · Be careful not to take pain medicine more often than the instructions allow. You could get worse or more frequent headaches when the medicine wears off. To prevent migraines · Keep a headache diary so you can figure out what triggers your headaches. Avoiding triggers may help you prevent headaches. Record when each headache began, how long it lasted, and what the pain was like. (Was it throbbing, aching, stabbing, or dull?) Write down any other symptoms you had with the headache, such as nausea, flashing lights or dark spots, or sensitivity to bright light or loud noise. Note if the headache occurred near your period. List anything that might have triggered the headache. Triggers may include certain foods (chocolate, cheese, wine) or odors, smoke, bright light, stress, or lack of sleep. · If your doctor has prescribed medicine for your migraines, take it as directed. You may have medicine that you take only when you get a migraine and medicine that you take all the time to help prevent migraines. ¨ If your doctor has prescribed medicine for when you get a headache, take it at the first sign of a migraine, unless your doctor has given you other instructions. ¨ If your doctor has prescribed medicine to prevent migraines, take it exactly as prescribed. Call your doctor if you think you are having a problem with your medicine. · Find healthy ways to deal with stress. Migraines are most common during or right after stressful times. Take time to relax before and after you do something that has caused a migraine in the past. 
· Try to keep your muscles relaxed by keeping good posture.  Check your jaw, face, neck, and shoulder muscles for tension. Try to relax them. When you sit at a desk, change positions often. And make sure to stretch for 30 seconds each hour. · Get plenty of sleep and exercise. · Eat meals on a regular schedule. Avoid foods and drinks that often trigger migraines. These include chocolate, alcohol (especially red wine and port), aspartame, monosodium glutamate (MSG), and some additives found in foods (such as hot dogs, bianchi, cold cuts, aged cheeses, and pickled foods). · Limit caffeine. Don't drink too much coffee, tea, or soda. But don't quit caffeine suddenly. That can also give you migraines. · Do not smoke or allow others to smoke around you. If you need help quitting, talk to your doctor about stop-smoking programs and medicines. These can increase your chances of quitting for good. · If you are taking birth control pills or hormone therapy, talk to your doctor about whether they are triggering your migraines. When should you call for help? Call 911 anytime you think you may need emergency care. For example, call if: 
? · You have signs of a stroke. These may include: 
¨ Sudden numbness, paralysis, or weakness in your face, arm, or leg, especially on only one side of your body. ¨ Sudden vision changes. ¨ Sudden trouble speaking. ¨ Sudden confusion or trouble understanding simple statements. ¨ Sudden problems with walking or balance. ¨ A sudden, severe headache that is different from past headaches. ?Call your doctor now or seek immediate medical care if: 
? · You have new or worse nausea and vomiting. ? · You have a new or higher fever. ? · Your headache gets much worse. ? Watch closely for changes in your health, and be sure to contact your doctor if: 
? · You are not getting better after 2 days (48 hours). Where can you learn more? Go to http://jonnathan-marcel.info/.  
Enter V053 in the search box to learn more about \"Migraine Headache: Care Instructions. \" Current as of: October 14, 2016 Content Version: 11.4 © 8482-1063 [employer]. Care instructions adapted under license by [employer] [facility] [employer] (which disclaims liability or warranty for this information). If you have questions about a medical condition or this instruction, always ask your healthcare professional. [address] any warranty or liability for your use of this information. Introducing [facility]! Dear Eli Marte: 
Thank you for requesting a Nayatek account. Our records indicate that you already have an active Nayatek account. You can access your account anytime at [email or web address]. [email or web address] Did you know that you can access your hospital and ER discharge instructions at any time in Nayatek? You can also review all of your test results from your hospital stay or ER visit. Additional Information If you have questions, please visit the Frequently Asked Questions section of the [employer]k website at [email or web address] Remember, N[employer]k is NOT to be used for urgent needs. For medical emergencies, dial 911. Now available from your iPhone and Android! Please provide this summary of care documentation to your next provider. Your primary care clinician is listed as Nohemi Simon. If you have any questions after today's visit, please call [phone].

## 2018-04-12 NOTE — PROGRESS NOTES
Subjective:      Dread Ovalles is a 28 y.o. female who presents today for migraine. Neurological Review  She is here today to talk about headaches. This is a new problem, these have been intermittent for the last month, lately they have been daily. Episodes are occuring daily for the last 2 weeks, she notes a pressure sensation to the back of her head or ears that worsens and takes over her whole head. She states when it worsens she can also get blurry vision. Before the headaches she will see spots. The pain is described as throbbing pain. Associated symptoms include: photosensitivity, noise sensitivity. Precipitating factors are: patient is aware of none. She denies a history of recent head injury. Treatments for headaches currently include: tylenol, she states it takes the pain away but she still has the pressure, takes pain from 8/10 to a 4/10. Patient Active Problem List    Diagnosis Date Noted    Migraine with aura and without status migrainosus, not intractable 04/12/2018    Anemia 04/26/2017    Obesity, Class I, BMI 30-34.9 03/16/2017     Current Outpatient Prescriptions   Medication Sig Dispense Refill    triamcinolone acetonide (KENALOG) 0.1 % ointment APPLY TO THE AFFECTED AREA TWICE DAILY 30 g 0    SUMAtriptan (IMITREX) 50 mg tablet Take 1 Tab by mouth once as needed for Migraine for up to 1 dose. 30 Tab 0    ferrous sulfate 325 mg (65 mg iron) tablet Take 1 Tab by mouth Daily (before breakfast). 90 Tab 3    levonorgestrel (MIRENA) 20 mcg/24 hr (5 years) IUD 1 Each by IntraUTERine route once. No Known Allergies  Past Medical History:   Diagnosis Date    Anemia         Review of Systems    A comprehensive review of systems was negative except for that written in the HPI.      Objective:     Visit Vitals    /74    Pulse 76    Temp 97.8 °F (36.6 °C) (Oral)    Resp 18    Ht 5' 6\" (1.676 m)    Wt 194 lb (88 kg)    Breastfeeding No    BMI 31.31 kg/m2     General appearance: alert, cooperative, no distress, appears stated age  Head: Normocephalic, without obvious abnormality, atraumatic  Eyes: conjunctivae/corneas clear. PERRL, EOM's intact. Fundi benign  Ears: normal TM's and external ear canals AU  Nose: Nares normal. Septum midline. Mucosa normal. No drainage or sinus tenderness. Throat: Lips, mucosa, and tongue normal. Teeth and gums normal  Neck: supple, symmetrical, trachea midline and mild anterior cervical adenopathy  Lungs: clear to auscultation bilaterally  Heart: regular rate and rhythm, S1, S2 normal, no murmur, click, rub or gallop  Extremities: extremities normal, atraumatic, no cyanosis or edema  Pulses: 2+ and symmetric  Neurologic: Alert and oriented X 3, normal strength and tone. Normal symmetric reflexes. Normal coordination and gait  Mental status: Alert, oriented, thought content appropriate  Cranial nerves:   I: smell Not tested   II: visual acuity  Not tested   II: visual fields Full to confrontation   II: pupils Equal, round, reactive to light   III,VII: ptosis none   III,IV,VI: extraocular muscles  Full ROM   V: mastication normal   V: facial light touch sensation  normal   V,VII: corneal reflex  present   VII: facial muscle function - upper  normal   VII: facial muscle function - lower normal   VIII: hearing Not tested   IX: soft palate elevation  normal   IX,X: gag reflex present   XI: trapezius strength  5/5   XI: sternocleidomastoid strength 5/5   XI: neck flexion strength  5/5   XII: tongue strength  normal     Sensory: normal  Motor:grossly normal  Reflexes: 2+ and symmetric  Coordination: normal  Gait: Normal  Nursing note and vitals reviewed  Assessment/Plan:       ICD-10-CM ICD-9-CM    1. Migraine with aura and without status migrainosus, not intractable G43.109 346.00 SUMAtriptan (IMITREX) 50 mg tablet   If no improvement I will send her to neurology. She will trial imitrex today and see how she does over the weekend.   Follow-up Disposition:  Return if symptoms worsen or fail to improve. Advised her to call back or return to office if symptoms worsen/change/persist.  Discussed expected course/resolution/complications of diagnosis in detail with patient. Medication risks/benefits/costs/interactions/alternatives discussed with patient. She was given an after visit summary which includes diagnoses, current medications, & vitals. She expressed understanding with the diagnosis and plan.

## 2018-04-27 DIAGNOSIS — L30.9 ECZEMA, UNSPECIFIED TYPE: ICD-10-CM

## 2018-04-27 RX ORDER — TRIAMCINOLONE ACETONIDE 1 MG/G
OINTMENT TOPICAL
Qty: 30 G | Refills: 0 | Status: SHIPPED | OUTPATIENT
Start: 2018-04-27 | End: 2019-01-03 | Stop reason: SDUPTHER

## 2018-06-08 ENCOUNTER — OFFICE VISIT (OUTPATIENT)
Dept: INTERNAL MEDICINE CLINIC | Facility: CLINIC | Age: 28
End: 2018-06-08

## 2018-06-08 VITALS
SYSTOLIC BLOOD PRESSURE: 104 MMHG | RESPIRATION RATE: 18 BRPM | WEIGHT: 194 LBS | BODY MASS INDEX: 31.18 KG/M2 | HEIGHT: 66 IN | OXYGEN SATURATION: 98 % | HEART RATE: 85 BPM | DIASTOLIC BLOOD PRESSURE: 68 MMHG | TEMPERATURE: 98.4 F

## 2018-06-08 DIAGNOSIS — J02.9 PHARYNGITIS, UNSPECIFIED ETIOLOGY: Primary | ICD-10-CM

## 2018-06-08 DIAGNOSIS — J06.9 VIRAL URI: ICD-10-CM

## 2018-06-08 LAB
S PYO AG THROAT QL: NEGATIVE
VALID INTERNAL CONTROL?: YES

## 2018-06-08 NOTE — PROGRESS NOTES
Subjective:      Madiha Clemons is a 28 y.o. female who presents today for acute care. Acute care: she notes ear pressure and sore throat. Symptoms started yesterday with wheezing, body aches. She denies fevers, nausea. She has tried mucinex and states it made the sinus pressure better. She is also taking tylenol. Sick contacts: she is a . Patient Active Problem List    Diagnosis Date Noted    Migraine with aura and without status migrainosus, not intractable 04/12/2018    Anemia 04/26/2017    Obesity, Class I, BMI 30-34.9 03/16/2017     Current Outpatient Prescriptions   Medication Sig Dispense Refill    triamcinolone acetonide (KENALOG) 0.1 % ointment APPLY TO THE AFFECTED AREA TWICE DAILY 30 g 0    SUMAtriptan (IMITREX) 50 mg tablet Take 1 Tab by mouth once as needed for Migraine for up to 1 dose. 30 Tab 0    ferrous sulfate 325 mg (65 mg iron) tablet Take 1 Tab by mouth Daily (before breakfast). 90 Tab 3    levonorgestrel (MIRENA) 20 mcg/24 hr (5 years) IUD 1 Each by IntraUTERine route once. No Known Allergies  Past Medical History:   Diagnosis Date    Anemia         Review of Systems    A comprehensive review of systems was negative except for that written in the HPI. Objective:     Visit Vitals    /68 (BP 1 Location: Right arm, BP Patient Position: Sitting)    Pulse 85    Temp 98.4 °F (36.9 °C) (Oral)    Resp 18    Ht 5' 6\" (1.676 m)    Wt 194 lb (88 kg)    SpO2 98%    BMI 31.31 kg/m2     General appearance: alert, cooperative, no distress, appears stated age  Head: Normocephalic, without obvious abnormality, atraumatic  Eyes: negative  Ears: abnormal TM AD - serous middle ear fluid, abnormal TM AS - serous middle ear fluid  Nose: Nares normal. Septum midline. Mucosa normal. No drainage or sinus tenderness.   Throat: Lips, mucosa, and tongue normal. Teeth and gums normal  Neck: supple, symmetrical, trachea midline and moderate anterior cervical adenopathy  Lungs: clear to auscultation bilaterally  Heart: regular rate and rhythm, S1, S2 normal, no murmur, click, rub or gallop  Extremities: extremities normal, atraumatic, no cyanosis or edema  Neurologic: Alert and oriented X 3, normal strength and tone. Normal symmetric reflexes. Normal coordination and gait  Nursing note and vitals reviewed  Assessment/Plan:       ICD-10-CM ICD-9-CM    1. Pharyngitis, unspecified etiology J02.9 462 AMB POC RAPID STREP A   2. Viral URI J06.9 465.9      Follow-up Disposition:  Return if symptoms worsen or fail to improve. Advised her to call back or return to office if symptoms worsen/change/persist.  Discussed expected course/resolution/complications of diagnosis in detail with patient. Medication risks/benefits/costs/interactions/alternatives discussed with patient. She was given an after visit summary which includes diagnoses, current medications, & vitals. She expressed understanding with the diagnosis and plan.

## 2018-06-08 NOTE — PATIENT INSTRUCTIONS
Viral Respiratory Infection: Care Instructions  Your Care Instructions    Viruses are very small organisms. They grow in number after they enter your body. There are many types that cause different illnesses, such as colds and the mumps. The symptoms of a viral respiratory infection often start quickly. They include a fever, sore throat, and runny nose. You may also just not feel well. Or you may not want to eat much. Most viral respiratory infections are not serious. They usually get better with time and self-care. Antibiotics are not used to treat a viral infection. That's because antibiotics will not help cure a viral illness. In some cases, antiviral medicine can help your body fight a serious viral infection. Follow-up care is a key part of your treatment and safety. Be sure to make and go to all appointments, and call your doctor if you are having problems. It's also a good idea to know your test results and keep a list of the medicines you take. How can you care for yourself at home? · Rest as much as possible until you feel better. · Be safe with medicines. Take your medicine exactly as prescribed. Call your doctor if you think you are having a problem with your medicine. You will get more details on the specific medicine your doctor prescribes. · Take an over-the-counter pain medicine, such as acetaminophen (Tylenol), ibuprofen (Advil, Motrin), or naproxen (Aleve), as needed for pain and fever. Read and follow all instructions on the label. Do not give aspirin to anyone younger than 20. It has been linked to Reye syndrome, a serious illness. · Drink plenty of fluids, enough so that your urine is light yellow or clear like water. Hot fluids, such as tea or soup, may help relieve congestion in your nose and throat. If you have kidney, heart, or liver disease and have to limit fluids, talk with your doctor before you increase the amount of fluids you drink.   · Try to clear mucus from your lungs by breathing deeply and coughing. · Gargle with warm salt water once an hour. This can help reduce swelling and throat pain. Use 1 teaspoon of salt mixed in 1 cup of warm water. · Do not smoke or allow others to smoke around you. If you need help quitting, talk to your doctor about stop-smoking programs and medicines. These can increase your chances of quitting for good. To avoid spreading the virus  · Cough or sneeze into a tissue. Then throw the tissue away. · If you don't have a tissue, use your hand to cover your cough or sneeze. Then clean your hand. You can also cough into your sleeve. · Wash your hands often. Use soap and warm water. Wash for 15 to 20 seconds each time. · If you don't have soap and water near you, you can clean your hands with alcohol wipes or gel. When should you call for help? Call your doctor now or seek immediate medical care if:  ? · You have a new or higher fever. ? · Your fever lasts more than 48 hours. ? · You have trouble breathing. ? · You have a fever with a stiff neck or a severe headache. ? · You are sensitive to light. ? · You feel very sleepy or confused. ? Watch closely for changes in your health, and be sure to contact your doctor if:  ? · You do not get better as expected. Where can you learn more? Go to http://jonnathan-marcel.info/. Enter B620 in the search box to learn more about \"Viral Respiratory Infection: Care Instructions. \"  Current as of: May 12, 2017  Content Version: 11.4  © 8655-7697 [employer]. Care instructions adapted under license by [facility] (which disclaims liability or warranty for this information). If you have questions about a medical condition or this instruction, always ask your healthcare professional. [address] any warranty or liability for your use of this information.

## 2018-06-08 NOTE — PROGRESS NOTES
Room 4    Chief Complaint   Patient presents with    Sore Throat     Prewssure in both ears     1. Have you been to the ER, urgent care clinic since your last visit? Hospitalized since your last visit? No    2. Have you seen or consulted any other health care providers outside of the [facility] since your last visit? Include any pap smears or colon screening.  No     Health Maintenance Due   Topic Date Due    DTaP/Tdap/Td series (2 - Tdap) 03/26/2018

## 2018-09-27 ENCOUNTER — TELEPHONE (OUTPATIENT)
Dept: INTERNAL MEDICINE CLINIC | Facility: CLINIC | Age: 28
End: 2018-09-27

## 2018-09-27 NOTE — TELEPHONE ENCOUNTER
Patient called into office wanting to schedule an appointment because she is experiencing some tightness in chest area and heaviness in left arm area. Patient states she is not having SOB. Encouraged patient to seek ER/Urgent care for evaluation. Patient expressed concerns about the difference in co-pay ER versus office. Advised patient our treatment in office would be limited versus going to the emergency room. Patient states she would think about it. Expressed to patient to think about her health and care needed.

## 2019-01-03 ENCOUNTER — OFFICE VISIT (OUTPATIENT)
Dept: INTERNAL MEDICINE CLINIC | Facility: CLINIC | Age: 29
End: 2019-01-03

## 2019-01-03 VITALS
BODY MASS INDEX: 32.62 KG/M2 | RESPIRATION RATE: 16 BRPM | DIASTOLIC BLOOD PRESSURE: 75 MMHG | WEIGHT: 203 LBS | TEMPERATURE: 98.4 F | HEIGHT: 66 IN | HEART RATE: 97 BPM | SYSTOLIC BLOOD PRESSURE: 119 MMHG

## 2019-01-03 DIAGNOSIS — R30.0 DYSURIA: ICD-10-CM

## 2019-01-03 DIAGNOSIS — L30.9 ECZEMA, UNSPECIFIED TYPE: ICD-10-CM

## 2019-01-03 DIAGNOSIS — F41.9 ANXIETY DISORDER, UNSPECIFIED TYPE: ICD-10-CM

## 2019-01-03 DIAGNOSIS — B37.31 VAGINAL YEAST INFECTION: Primary | ICD-10-CM

## 2019-01-03 DIAGNOSIS — R35.0 URINARY FREQUENCY: ICD-10-CM

## 2019-01-03 LAB
BILIRUB UR QL STRIP: NEGATIVE
GLUCOSE UR-MCNC: NEGATIVE MG/DL
KETONES P FAST UR STRIP-MCNC: NEGATIVE MG/DL
PH UR STRIP: 6.5 [PH] (ref 4.6–8)
PROT UR QL STRIP: NEGATIVE
SP GR UR STRIP: 1.01 (ref 1–1.03)
UA UROBILINOGEN AMB POC: NORMAL (ref 0.2–1)
URINALYSIS CLARITY POC: CLEAR
URINALYSIS COLOR POC: YELLOW
URINE BLOOD POC: NEGATIVE
URINE LEUKOCYTES POC: NEGATIVE
URINE NITRITES POC: NEGATIVE

## 2019-01-03 RX ORDER — ALPRAZOLAM 0.5 MG/1
TABLET ORAL
Qty: 20 TAB | Refills: 0 | Status: SHIPPED | OUTPATIENT
Start: 2019-01-03 | End: 2019-07-22 | Stop reason: SDUPTHER

## 2019-01-03 RX ORDER — TRIAMCINOLONE ACETONIDE 1 MG/G
OINTMENT TOPICAL
Qty: 30 G | Refills: 0 | Status: SHIPPED | OUTPATIENT
Start: 2019-01-03 | End: 2020-03-23

## 2019-01-03 RX ORDER — BUPROPION HYDROCHLORIDE 150 MG/1
TABLET ORAL
Qty: 30 TAB | Refills: 11 | Status: SHIPPED | OUTPATIENT
Start: 2019-01-03 | End: 2019-09-03 | Stop reason: SDUPTHER

## 2019-01-03 RX ORDER — FLUCONAZOLE 150 MG/1
TABLET ORAL
Qty: 2 TAB | Refills: 0 | Status: SHIPPED | OUTPATIENT
Start: 2019-01-03 | End: 2019-10-10

## 2019-01-03 NOTE — PROGRESS NOTES
Chief Complaint   Patient presents with    Bladder Infection     feels like she has to urinate often. Not sure if it is UTI or yeast infection      1. Have you been to the ER, urgent care clinic since your last visit? Hospitalized since your last visit? No    2. Have you seen or consulted any other health care providers outside of the [address] [place] [address] since your last visit? Include any pap smears or colon screening.  No

## 2019-01-03 NOTE — PROGRESS NOTES
Subjective:      Ha Crabtree is a 33 y.o. female who presents today for acute care. Medication refills requested    Urinary frequency: symptoms started 3-4 days, they include vaginal itching. She notes a fishy smell. . She denies fevers, discharge. She has tried increasing fluids. She notes symptoms are stable. Patient Active Problem List    Diagnosis Date Noted    Migraine with aura and without status migrainosus, not intractable 04/12/2018    Anemia 04/26/2017    Obesity, Class I, BMI 30-34.9 03/16/2017     Current Outpatient Medications   Medication Sig Dispense Refill    buPROPion XL (WELLBUTRIN XL) 150 mg tablet TAKE 1 TABLET BY MOUTH EVERY MORNING 30 Tab 5    ALPRAZolam (XANAX) 0.5 mg tablet TAKE 1 TABLET BY MOUTH DAILY AS NEEDED FOR ANXIETY MAXIMUM:2//DAY START WITH ONE-HALF TABLET 20 Tab 0    triamcinolone acetonide (KENALOG) 0.1 % ointment APPLY TO THE AFFECTED AREA TWICE DAILY 30 g 0    SUMAtriptan (IMITREX) 50 mg tablet Take 1 Tab by mouth once as needed for Migraine for up to 1 dose. 30 Tab 0    ferrous sulfate 325 mg (65 mg iron) tablet Take 1 Tab by mouth Daily (before breakfast). 90 Tab 3    levonorgestrel (MIRENA) 20 mcg/24 hr (5 years) IUD 1 Each by IntraUTERine route once. No Known Allergies  Past Medical History:   Diagnosis Date    Anemia      No past surgical history on file. Review of Systems    A comprehensive review of systems was negative except for that written in the HPI. Objective:     Visit Vitals  /75   Pulse 97   Temp 98.4 °F (36.9 °C) (Oral)   Resp 16   Ht 5' 6\" (1.676 m)   Wt 203 lb (92.1 kg)   BMI 32.77 kg/m²     General appearance: alert, cooperative, no distress, appears stated age  Head: Normocephalic, without obvious abnormality, atraumatic  Eyes: negative  Back: symmetric, no curvature. ROM normal. No CVA tenderness.   Lungs: clear to auscultation bilaterally  Heart: regular rate and rhythm, S1, S2 normal, no murmur, click, rub or gallop  Abdomen: soft, non-tender. Bowel sounds normal. No masses,  no organomegaly  Neurologic: Alert and oriented X 3, normal strength and tone. Normal symmetric reflexes. Normal coordination and gait  Skin: eczema noted to left side of neck  Psych: appropriate mood, speech, affect  Nursing note and vitals reviewed  Assessment/Plan:       ICD-10-CM ICD-9-CM    1. Vaginal yeast infection B37.3 112.1 fluconazole (DIFLUCAN) 150 mg tablet   2. Dysuria R30.0 788.1 AMB POC URINALYSIS DIP STICK AUTO W/O MICRO   3. Urinary frequency R35.0 788.41    4. Anxiety disorder, unspecified type F41.9 300.00 ALPRAZolam (XANAX) 0.5 mg tablet      buPROPion XL (WELLBUTRIN XL) 150 mg tablet   5. Eczema, unspecified type L30.9 692.9 triamcinolone acetonide (KENALOG) 0.1 % ointment       Follow-up Disposition:  Return if symptoms worsen or fail to improve. Advised her to call back or return to office if symptoms worsen/change/persist.  Discussed expected course/resolution/complications of diagnosis in detail with patient. Medication risks/benefits/costs/interactions/alternatives discussed with patient. She was given an after visit summary which includes diagnoses, current medications, & vitals. She expressed understanding with the diagnosis and plan.

## 2019-03-14 ENCOUNTER — HOSPITAL ENCOUNTER (OUTPATIENT)
Dept: GENERAL RADIOLOGY | Age: 29
Discharge: HOME | End: 2019-03-14
Payer: COMMERCIAL

## 2019-03-14 ENCOUNTER — OFFICE VISIT (OUTPATIENT)
Dept: INTERNAL MEDICINE CLINIC | Facility: CLINIC | Age: 29
End: 2019-03-14

## 2019-03-14 VITALS
SYSTOLIC BLOOD PRESSURE: 105 MMHG | BODY MASS INDEX: 31.98 KG/M2 | WEIGHT: 199 LBS | TEMPERATURE: 98.6 F | RESPIRATION RATE: 16 BRPM | DIASTOLIC BLOOD PRESSURE: 60 MMHG | HEIGHT: 66 IN | HEART RATE: 106 BPM

## 2019-03-14 DIAGNOSIS — R05.9 COUGH: ICD-10-CM

## 2019-03-14 DIAGNOSIS — R05.9 COUGH: Primary | ICD-10-CM

## 2019-03-14 PROCEDURE — 71046 X-RAY EXAM CHEST 2 VIEWS: CPT

## 2019-03-14 NOTE — PROGRESS NOTES
Chief Complaint   Patient presents with    Cough     Been coughing since monday night chest tigtness and pain when breathing. 1. Have you been to the ER, urgent care clinic since your last visit? Hospitalized since your last visit? No    2. Have you seen or consulted any other health care providers outside of the [address] [place] [address] since your last visit? Include any pap smears or colon screening.  No

## 2019-03-14 NOTE — PROGRESS NOTES
Called and spoke with pt explained per [facility] her Chest xray was clear and she can start OTC treatment as discussed.  Edita Johnson LPN

## 2019-03-14 NOTE — PROGRESS NOTES
Subjective:      Weston Felty is a 33 y.o. female who presents today for cough. Cough: symptoms started Monday, they include chest tightness and \"pain when breathing\". She also notes SOB and pain to her right scapula, headaches. She states her throat started out with itching but this has resolved. She denies fevers, wheezing, sore throat. She has tried allergy medicines, advil. She notes symptoms are stable. 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    Patient Active Problem List    Diagnosis Date Noted    Migraine with aura and without status migrainosus, not intractable 04/12/2018    Anemia 04/26/2017    Obesity, Class I, BMI 30-34.9 03/16/2017     Current Outpatient Medications   Medication Sig Dispense Refill    ALPRAZolam (XANAX) 0.5 mg tablet TAKE 1 TABLET BY MOUTH DAILY AS NEEDED FOR ANXIETY MAXIMUM:2//DAY START WITH ONE-HALF TABLET 20 Tab 0    triamcinolone acetonide (KENALOG) 0.1 % ointment APPLY TO THE AFFECTED AREA TWICE DAILY 30 g 0    buPROPion XL (WELLBUTRIN XL) 150 mg tablet TAKE 1 TABLET BY MOUTH EVERY MORNING 30 Tab 11    ferrous sulfate 325 mg (65 mg iron) tablet Take 1 Tab by mouth Daily (before breakfast). 90 Tab 3    levonorgestrel (MIRENA) 20 mcg/24 hr (5 years) IUD 1 Each by IntraUTERine route once.  fluconazole (DIFLUCAN) 150 mg tablet Take 1 tab today and another in 72 hours. FDA advises cautious prescribing of oral fluconazole in pregnancy. 2 Tab 0    SUMAtriptan (IMITREX) 50 mg tablet Take 1 Tab by mouth once as needed for Migraine for up to 1 dose. 30 Tab 0     No Known Allergies  Past Medical History:   Diagnosis Date    Anemia      No past surgical history on file. Review of Systems    A comprehensive review of systems was negative except for that written in the HPI.      Objective:     Visit Vitals  /60   Pulse (!) 106   Temp 98.6 °F (37 °C) (Oral)   Resp 16   Ht 5' 6\" (1.676 m)   Wt 199 lb (90.3 kg)   BMI 32.12 kg/m²     General appearance: alert, cooperative, no distress, appears stated age  Head: Normocephalic, without obvious abnormality, atraumatic  Eyes: negative  Ears: Right TM with slight erythema, Left TM blocked by cerumen  Nose: Nares normal. Septum midline. Mucosa normal. No drainage or sinus tenderness. Throat: Lips, mucosa, and tongue normal. Teeth and gums normal  Neck: supple, symmetrical, trachea midline and no adenopathy  Lungs: clear to auscultation bilaterally  Chest wall: no tenderness  Heart: regular rate and rhythm, S1, S2 normal, no murmur, click, rub or gallop  Neurologic: Grossly normal  Nursing note and vitals reviewed  Assessment/Plan:       ICD-10-CM ICD-9-CM    1. Cough R05 786.2 XR CHEST PA LAT   She will get CXR now. This will dictate treatment    Follow-up Disposition:  Return if symptoms worsen or fail to improve. Advised her to call back or return to office if symptoms worsen/change/persist.  Discussed expected course/resolution/complications of diagnosis in detail with patient. Medication risks/benefits/costs/interactions/alternatives discussed with patient. She was given an after visit summary which includes diagnoses, current medications, & vitals. She expressed understanding with the diagnosis and plan.

## 2019-07-22 ENCOUNTER — OFFICE VISIT (OUTPATIENT)
Dept: INTERNAL MEDICINE CLINIC | Facility: CLINIC | Age: 29
End: 2019-07-22

## 2019-07-22 VITALS
TEMPERATURE: 98 F | WEIGHT: 178 LBS | OXYGEN SATURATION: 98 % | HEART RATE: 92 BPM | DIASTOLIC BLOOD PRESSURE: 71 MMHG | SYSTOLIC BLOOD PRESSURE: 106 MMHG | BODY MASS INDEX: 28.61 KG/M2 | HEIGHT: 66 IN | RESPIRATION RATE: 16 BRPM

## 2019-07-22 DIAGNOSIS — H61.22 IMPACTED CERUMEN, LEFT EAR: ICD-10-CM

## 2019-07-22 DIAGNOSIS — R61 HYPERHIDROSIS: ICD-10-CM

## 2019-07-22 DIAGNOSIS — E66.3 OVERWEIGHT (BMI 25.0-29.9): ICD-10-CM

## 2019-07-22 DIAGNOSIS — F41.9 ANXIETY DISORDER, UNSPECIFIED TYPE: ICD-10-CM

## 2019-07-22 DIAGNOSIS — J01.00 ACUTE NON-RECURRENT MAXILLARY SINUSITIS: Primary | ICD-10-CM

## 2019-07-22 PROBLEM — E66.9 OBESITY, CLASS I, BMI 30-34.9: Status: RESOLVED | Noted: 2017-03-16 | Resolved: 2019-07-22

## 2019-07-22 RX ORDER — ALPRAZOLAM 0.5 MG/1
TABLET ORAL
Qty: 20 TAB | Refills: 0 | Status: SHIPPED | OUTPATIENT
Start: 2019-07-22 | End: 2021-05-10

## 2019-07-22 NOTE — PROGRESS NOTES
Subjective:      Giselle Conway is a 33 y.o. female who presents today for sinus concerns. Sinus concern: symptoms started 3 days, they include sinus pressure, nasal and chest congestion, bilateral ear pressure. She denies fevers, chills, sweats, cough, wheezing, SOB. She has tried advil and tylenol, tea, water. She notes intermittent symptoms that are worsening. 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 she is working on this and has changed her diet. She is also exercising 6 days a week. Wt Readings from Last 3 Encounters:   07/22/19 178 lb (80.7 kg)   03/14/19 199 lb (90.3 kg)   01/03/19 203 lb (92.1 kg)     Hyperhidrosis: since losing weight and exercising more she notes that she is having excessive axillary perspiration. Mental Health Review  Patient is seen for anxiety disorder. Since last visit: she states this is fairly well controlled, she uses xanax 1 times a week at max. Ongoing symptoms include: none. She denies: SI/SA. Reported side effects from the treatment: somnolence. Patient Active Problem List    Diagnosis Date Noted    Migraine with aura and without status migrainosus, not intractable 04/12/2018    Anemia 04/26/2017    Obesity, Class I, BMI 30-34.9 03/16/2017     Current Outpatient Medications   Medication Sig Dispense Refill    ALPRAZolam (XANAX) 0.5 mg tablet TAKE 1 TABLET BY MOUTH DAILY AS NEEDED FOR ANXIETY MAXIMUM:2//DAY START WITH ONE-HALF TABLET 20 Tab 0    triamcinolone acetonide (KENALOG) 0.1 % ointment APPLY TO THE AFFECTED AREA TWICE DAILY 30 g 0    fluconazole (DIFLUCAN) 150 mg tablet Take 1 tab today and another in 72 hours. FDA advises cautious prescribing of oral fluconazole in pregnancy.  2 Tab 0    buPROPion XL (WELLBUTRIN XL) 150 mg tablet TAKE 1 TABLET BY MOUTH EVERY MORNING 30 Tab 11    SUMAtriptan (IMITREX) 50 mg tablet Take 1 Tab by mouth once as needed for Migraine for up to 1 dose. 30 Tab 0    ferrous sulfate 325 mg (65 mg iron) tablet Take 1 Tab by mouth Daily (before breakfast). 90 Tab 3    levonorgestrel (MIRENA) 20 mcg/24 hr (5 years) IUD 1 Each by IntraUTERine route once. No Known Allergies  Past Medical History:   Diagnosis Date    Anemia      No past surgical history on file. Review of Systems    A comprehensive review of systems was negative except for that written in the HPI. Objective:     Visit Vitals  /71 (BP 1 Location: Left arm, BP Patient Position: Sitting)   Pulse 92   Temp 98 °F (36.7 °C) (Oral)   Resp 16   Ht 5' 6\" (1.676 m)   Wt 178 lb (80.7 kg)   SpO2 98%   BMI 28.73 kg/m²     General appearance: alert, cooperative, no distress, appears stated age  Head: Normocephalic, without obvious abnormality, atraumatic  Eyes: negative  Ears: Left TM with cerumen, right TM with mild serous fluid  Nose: turbinates edematous, inflamed, sinus tenderness bilateral  Throat: Lips, mucosa, and tongue normal. Teeth and gums normal  Neck: supple, symmetrical, trachea midline and no adenopathy  Lungs: clear to auscultation bilaterally  Heart: regular rate and rhythm, S1, S2 normal, no murmur, click, rub or gallop  Neurologic: Grossly normal  Nursing note and vitals reviewed  Assessment/Plan:       ICD-10-CM ICD-9-CM    1. Acute non-recurrent maxillary sinusitis J01.00 461.0    2. Anxiety disorder, unspecified type F41.9 300.00 ALPRAZolam (XANAX) 0.5 mg tablet   3. Hyperhidrosis R61 705.21 aluminum chloride (HYPERCARE) 15 % (w/v) liqd   4. Impacted cerumen, left ear H61.22 380.4    5. Overweight (BMI 25.0-29. 9) E66.3 278.02    Weight loss efforts encouraged! She will use OTC therapy for 48 hrs, if no improvement I will send augmentin. Follow-up and Dispositions    · Return if symptoms worsen or fail to improve.                Advised her to call back or return to office if symptoms worsen/change/persist.  Discussed expected course/resolution/complications of diagnosis in detail with patient. Medication risks/benefits/costs/interactions/alternatives discussed with patient. She was given an after visit summary which includes diagnoses, current medications, & vitals. She expressed understanding with the diagnosis and plan.

## 2019-07-22 NOTE — PROGRESS NOTES
Identified pt with two pt identifiers(name and ). Reviewed record in preparation for visit and have obtained necessary documentation. Chief Complaint   Patient presents with    Cold Symptoms     sinus fullness, nasal and chest congestion and bitaleral ear pressure for 3 days        Health Maintenance Due   Topic    DTaP/Tdap/Td series (2 - Tdap)    PAP AKA CERVICAL CYTOLOGY        Coordination of Care Questionnaire:  :   1) Have you been to an emergency room, urgent care, or hospitalized since your last visit? If yes, where when, and reason for visit? no     2. Have seen or consulted any other health care provider other than [facility] since your last visit? If yes, where when, and reason for visit? NO    3) Do you have an Advanced Directive/ Living Will in place? NO  If yes, do we have a copy on file NO  If no, would you like information NO    Patient is accompanied by self I have received verbal consent from [address] to discuss any/all medical information while they are present in the room.     Visit Vitals  /71 (BP 1 Location: Left arm, BP Patient Position: Sitting)   Pulse 92   Temp 98 °F (36.7 °C) (Oral)   Resp 16   Ht 5' 6\" (1.676 m)   Wt 178 lb (80.7 kg)   SpO2 98%   BMI 28.73 kg/m²     Ailin Simpson LPN

## 2019-08-09 DIAGNOSIS — R61 HYPERHIDROSIS: ICD-10-CM

## 2019-09-16 ENCOUNTER — TELEPHONE (OUTPATIENT)
Dept: INTERNAL MEDICINE CLINIC | Age: 29
End: 2019-09-16

## 2019-10-10 ENCOUNTER — OFFICE VISIT (OUTPATIENT)
Dept: CARDIOLOGY CLINIC | Age: 29
End: 2019-10-10

## 2019-10-10 VITALS
HEART RATE: 88 BPM | HEIGHT: 66 IN | DIASTOLIC BLOOD PRESSURE: 60 MMHG | RESPIRATION RATE: 16 BRPM | SYSTOLIC BLOOD PRESSURE: 94 MMHG | OXYGEN SATURATION: 99 % | BODY MASS INDEX: 27.1 KG/M2 | WEIGHT: 168.6 LBS

## 2019-10-10 DIAGNOSIS — R07.89 CHEST DISCOMFORT: ICD-10-CM

## 2019-10-10 DIAGNOSIS — R42 DIZZINESS: Primary | ICD-10-CM

## 2019-10-10 DIAGNOSIS — I95.9 HYPOTENSION, UNSPECIFIED HYPOTENSION TYPE: ICD-10-CM

## 2019-10-10 DIAGNOSIS — F41.9 ANXIETY: ICD-10-CM

## 2019-10-10 DIAGNOSIS — Z82.49 FAMILY HISTORY OF EARLY CAD: ICD-10-CM

## 2019-10-10 DIAGNOSIS — R53.83 FATIGUE, UNSPECIFIED TYPE: ICD-10-CM

## 2019-10-10 NOTE — PATIENT INSTRUCTIONS
Please have labs drawn at [facility] today   Continue to add gatorade to your hydration regimen  Please drink at least 80 oz of water daily  Please continue to add salt to your food three times/day  Please check your blood pressure daily and keep a written record of your blood pressure readings. Please email me your readings on [employer] in 1 month.

## 2019-10-10 NOTE — PROGRESS NOTES
[facility]  [phone]    Reason for consult: hypotension  Requesting physician: Jose Yadav NP     HPI: Lakshmi Hensley is a 33y.o. year-old who presents for evaluation of hypotension. Having dizzy episodes, worse with bending over and coming back up   Heart will race a little bit when her BP is low  She only has palpitations when she is anxious or when her BP is low  Denies any syncope  Yesterday she had chest tightness which lasted less than minute, happened while moving things around the class, sometimes she gets it working out too  No dyspnea with exertion, no PND  No LE edema  Drinks 80 oz of water daily   Cut out sodium and sugar 6 months ago for weight loss  She has lost 35 lbs since April   Exercises 6 days/week   Now with dizziness and hypotension she just started adding salt to her food again about a week ago  BP readings 85/60, 75/55  She is afraid of needles, stressed importance of getting labs drawn    For now no syncoep, so will focus on hydration and salt, no medications today, check thyroid and lytes. Assessment/Plan:  1. Hypotension - likely related to recent sodium restriction and rapid weight loss, will check TSH, T4, CMP and magnesium level, will order TTE to assess cardiac structure and function  -advised her to continue adding salt to her food and drink over 80 oz of water daily   -if BP and dizziness does not improve may need to add midodrine or florinef   2. Family history of early CAD will watch this but no specific testing at thsis time, proceed with wieght loss and diet efforts. Mediterranean diet. 3.  Anxiety - on wellbutrin, xanax PRN   4. Body mass index is 27.21 kg/m². working on weight loss and making a lot of progress. Fam Hx: mother with HTN, dyslipidemia, DM, father has CAD at age 62, has two stents, hx of TIA in his mid 46s  Soc Hx: she vapes, 2 drinks/weekend, no illegal drugs    She  has a past medical history of Anemia.  She also has no past medical history of Abnormal Pap smear, Abuse, Anemia NEC, Arrhythmia, Arthritis, Asthma, Autoimmune disease ([address] CAD (coronary artery disease), Calculus of kidney, Cancer ([address] Chlamydia, Chronic kidney disease, Chronic pain, Complication of anesthesia, Congestive heart failure, unspecified, Contact dermatitis and other eczema, due to unspecified cause, COPD, Depression, Diabetes ([address] Essential hypertension, Genital herpes, unspecified, GERD (gastroesophageal reflux disease), Gonorrhea, Headache(784.0), Heart abnormality, Herpes gestationis, Herpes simplex without mention of complication, Human immunodeficiency virus (HIV) disease ([address] OTHER MEDICAL, Hypercholesterolemia, Hypertension, Infertility, female, Kidney disease, Liver disease, Phlebitis and thrombophlebitis of unspecified site, Pituitary disorder ([address] Polycystic disease, ovaries, Postpartum depression, Psychiatric problem, Psychotic disorder ([address] PUD (peptic ulcer disease), Rhesus isoimmunization unspecified as to episode of care in pregnancy, Seizures ([address] Sickle-cell disease, unspecified, Stroke ([address] Syphilis, Systemic lupus erythematosus ([address] Thromboembolus ([address] Thyroid activity decreased, Thyroid disease, Trauma, Unspecified breast disorder, Unspecified diseases of blood and blood-forming organs, or Unspecified epilepsy without mention of intractable epilepsy. Cardiovascular ROS: positive for dizziness, chest pain   Respiratory ROS: no cough, shortness of breath, or wheezing  Neurological ROS: no TIA or stroke symptoms  All other systems negative except as above. PE  Vitals:    10/10/19 0828   BP: 94/60   Pulse: 88   Resp: 16   SpO2: 99%   Weight: 168 lb 9.6 oz (76.5 kg)   Height: 5' 6\" (1.676 m)    Body mass index is 27.21 kg/m².    General appearance - alert, well appearing, and in no distress  Mental status - affect appropriate to mood  Eyes - sclera anicteric, moist mucous membranes  Neck - supple  Lymphatics - not assessed  Chest - clear to auscultation, no wheezes, rales or rhonchi  Heart - normal rate, regular rhythm, normal S1, S2, no murmurs, rubs, clicks or gallops  Abdomen - soft, nontender, nondistended  Back exam - full range of motion, no tenderness  Neurological - cranial nerves II through XII grossly intact, no focal deficit  Musculoskeletal - no muscular tenderness noted, normal strength  Extremities - peripheral pulses normal, no pedal edema  Skin - normal coloration  no rashes    12 lead ECG: NSR    Recent Labs:  Lab Results   Component Value Date/Time    Cholesterol, total 181 04/22/2017 10:05 AM    HDL Cholesterol 60 04/22/2017 10:05 AM    LDL, calculated 105 (H) 04/22/2017 10:05 AM    Triglyceride 78 04/22/2017 10:05 AM     Lab Results   Component Value Date/Time    Creatinine 0.59 10/10/2019 09:30 AM     Lab Results   Component Value Date/Time    BUN 13 10/10/2019 09:30 AM     Lab Results   Component Value Date/Time    Potassium 4.2 10/10/2019 09:30 AM     No results found for: HBA1C, HGBE8, EKY8PVZY, MGQ8RXYE  Lab Results   Component Value Date/Time    HGB 12.4 03/12/2018 09:21 AM     Lab Results   Component Value Date/Time    PLATELET 136 98/55/3511 09:21 AM       Reviewed:  Past Medical History:   Diagnosis Date    Anemia      Social History     Tobacco Use   Smoking Status Former Smoker   Smokeless Tobacco Never Used   Tobacco Comment    light social smoker     Social History     Substance and Sexual Activity   Alcohol Use Yes    Comment: socially in moderation     No Known Allergies    Current Outpatient Medications   Medication Sig    buPROPion XL (WELLBUTRIN XL) 150 mg tablet TAKE 1 TABLET BY MOUTH EVERY MORNING    ALPRAZolam (XANAX) 0.5 mg tablet TAKE 1 TABLET BY MOUTH DAILY AS NEEDED FOR ANXIETY MAXIMUM:2//DAY START WITH ONE-HALF TABLET    aluminum chloride (HYPERCARE) 15 % (w/v) liqd Apply once at bedtime; once excessive sweating has stopped, decrease to once or twice weekly, or as needed. Wash treated area in the morning    triamcinolone acetonide (KENALOG) 0.1 % ointment APPLY TO THE AFFECTED AREA TWICE DAILY    SUMAtriptan (IMITREX) 50 mg tablet Take 1 Tab by mouth once as needed for Migraine for up to 1 dose.  levonorgestrel (MIRENA) 20 mcg/24 hr (5 years) IUD 1 Each by IntraUTERine route once. No current facility-administered medications for this visit.         Jaclyn Bee MD  [facility] [address], [address] [place] [address]  [facility], [address]  [phone]

## 2019-10-11 LAB
ALBUMIN SERPL-MCNC: 4.9 G/DL (ref 3.5–5.5)
ALBUMIN/GLOB SERPL: 1.8 {RATIO} (ref 1.2–2.2)
ALP SERPL-CCNC: 63 IU/L (ref 39–117)
ALT SERPL-CCNC: 13 IU/L (ref 0–32)
AST SERPL-CCNC: 16 IU/L (ref 0–40)
BILIRUB SERPL-MCNC: 0.4 MG/DL (ref 0–1.2)
BUN SERPL-MCNC: 13 MG/DL (ref 6–20)
BUN/CREAT SERPL: 22 (ref 9–23)
CALCIUM SERPL-MCNC: 10 MG/DL (ref 8.7–10.2)
CHLORIDE SERPL-SCNC: 100 MMOL/L (ref 96–106)
CO2 SERPL-SCNC: 23 MMOL/L (ref 20–29)
CREAT SERPL-MCNC: 0.59 MG/DL (ref 0.57–1)
GLOBULIN SER CALC-MCNC: 2.8 G/DL (ref 1.5–4.5)
GLUCOSE SERPL-MCNC: 87 MG/DL (ref 65–99)
MAGNESIUM SERPL-MCNC: 2 MG/DL (ref 1.6–2.3)
POTASSIUM SERPL-SCNC: 4.2 MMOL/L (ref 3.5–5.2)
PROT SERPL-MCNC: 7.7 G/DL (ref 6–8.5)
SODIUM SERPL-SCNC: 139 MMOL/L (ref 134–144)
T4 SERPL-MCNC: 8.1 UG/DL (ref 4.5–12)
TSH SERPL DL<=0.005 MIU/L-ACNC: 1.87 UIU/ML (ref 0.45–4.5)

## 2019-12-04 ENCOUNTER — OFFICE VISIT (OUTPATIENT)
Dept: INTERNAL MEDICINE CLINIC | Age: 29
End: 2019-12-04

## 2019-12-04 VITALS
BODY MASS INDEX: 27.23 KG/M2 | RESPIRATION RATE: 16 BRPM | SYSTOLIC BLOOD PRESSURE: 110 MMHG | HEIGHT: 66 IN | OXYGEN SATURATION: 98 % | TEMPERATURE: 98.4 F | HEART RATE: 89 BPM | DIASTOLIC BLOOD PRESSURE: 70 MMHG | WEIGHT: 169.4 LBS

## 2019-12-04 DIAGNOSIS — J00 ACUTE NASOPHARYNGITIS: ICD-10-CM

## 2019-12-04 DIAGNOSIS — R68.89 FLU-LIKE SYMPTOMS: ICD-10-CM

## 2019-12-04 LAB
QUICKVUE INFLUENZA TEST: NEGATIVE
VALID INTERNAL CONTROL?: YES

## 2019-12-04 RX ORDER — BUPROPION HYDROCHLORIDE 300 MG/1
300 TABLET ORAL
Qty: 30 TAB | Refills: 0 | Status: SHIPPED | OUTPATIENT
Start: 2019-12-04 | End: 2020-01-23

## 2019-12-04 NOTE — PROGRESS NOTES
Chief Complaint   Patient presents with    Pressure Behind the Eyes     getting worse. started yesterday     Reviewed record in preparation for visit and have obtained necessary documentation. Identified pt with two pt identifiers(name and ). Health Maintenance Due   Topic    DTaP/Tdap/Td series (2 - Tdap)    PAP AKA CERVICAL CYTOLOGY     Influenza Age 5 to Adult          Chief Complaint   Patient presents with    Pressure Behind the Eyes     getting worse. started yesterday        Wt Readings from Last 3 Encounters:   19 169 lb 6.4 oz (76.8 kg)   10/10/19 168 lb 9.6 oz (76.5 kg)   19 178 lb (80.7 kg)     Temp Readings from Last 3 Encounters:   19 98.4 °F (36.9 °C) (Oral)   19 98 °F (36.7 °C) (Oral)   19 98.6 °F (37 °C) (Oral)     BP Readings from Last 3 Encounters:   19 110/70   10/10/19 94/60   19 106/71     Pulse Readings from Last 3 Encounters:   19 89   10/10/19 88   19 92           Learning Assessment:  :     No flowsheet data found. Depression Screening:  :     3 most recent PHQ Screens 2019   Little interest or pleasure in doing things Not at all   Feeling down, depressed, irritable, or hopeless Not at all   Total Score PHQ 2 0   Trouble falling or staying asleep, or sleeping too much -   Feeling tired or having little energy -   Poor appetite, weight loss, or overeating -   Feeling bad about yourself - or that you are a failure or have let yourself or your family down -   Trouble concentrating on things such as school, work, reading, or watching TV -   Moving or speaking so slowly that other people could have noticed; or the opposite being so fidgety that others notice -   Thoughts of being better off dead, or hurting yourself in some way -   PHQ 9 Score -       Fall Risk Assessment:  :     No flowsheet data found. Abuse Screening:  :     No flowsheet data found.     Coordination of Care Questionnaire:  :     1) Have you been to an emergency room, urgent care clinic since your last visit? no   Hospitalized since your last visit? no             2) Have you seen or consulted any other health care providers outside of [address] [place] [address] since your last visit? no  (Include any pap smears or colon screenings in this section.)    3) Do you have an Advance Directive on file? no    4) Are you interested in receiving information on Advance Directives? NO      Patient is accompanied by self I have received verbal consent from [address] to discuss any/all medical information while they are present in the room. Reviewed record  In preparation for visit and have obtained necessary documentation.

## 2019-12-04 NOTE — PROGRESS NOTES
HISTORY OF PRESENT ILLNESS  Darnell Moise is a 26 y.o. female. Patient reports sinus congestion and sore throat starting yesterday with body aches starting today. No fever. She is a . She has taken mucinex, claritin, and advil with little relief. Visit Vitals  /70 (BP 1 Location: Left arm, BP Patient Position: Sitting)   Pulse 89   Temp 98.4 °F (36.9 °C) (Oral)   Resp 16   Ht 5' 6\" (1.676 m)   Wt 169 lb 6.4 oz (76.8 kg)   SpO2 98%   BMI 27.34 kg/m²       HPI    Review of Systems   HENT: Positive for congestion. Musculoskeletal: Positive for myalgias. Physical Exam  Constitutional:       Appearance: Normal appearance. HENT:      Head: Normocephalic. Right Ear: Hearing, tympanic membrane, ear canal and external ear normal.      Left Ear: Hearing, tympanic membrane, ear canal and external ear normal.      Nose: Congestion present. Right Turbinates: Swollen. Left Turbinates: Swollen. Mouth/Throat:      Lips: Pink. Mouth: Mucous membranes are moist.      Pharynx: Oropharynx is clear. Cardiovascular:      Rate and Rhythm: Normal rate and regular rhythm. Pulmonary:      Effort: Pulmonary effort is normal.      Breath sounds: Normal breath sounds. Neurological:      General: No focal deficit present. Mental Status: She is alert and oriented to person, place, and time. Psychiatric:         Mood and Affect: Mood normal.         Behavior: Behavior normal.       Results for orders placed or performed in visit on 12/04/19   AMB POC RAPID INFLUENZA TEST   Result Value Ref Range    VALID INTERNAL CONTROL POC Yes     QuickVue Influenza test Negative Negative         ASSESSMENT and PLAN    ICD-10-CM ICD-9-CM    1. Flu-like symptoms R68.89 780.99 AMB POC RAPID INFLUENZA TEST   2.  Acute nasopharyngitis J00 460      Orders Placed This Encounter    AMB POC RAPID INFLUENZA TEST    buPROPion XL (WELLBUTRIN XL) 300 mg XL tablet   flonase  May try sudafed if congestion worsens  Rest and hydrate

## 2019-12-04 NOTE — PATIENT INSTRUCTIONS

## 2020-01-23 RX ORDER — BUPROPION HYDROCHLORIDE 300 MG/1
TABLET ORAL
Qty: 30 TAB | Refills: 0 | Status: SHIPPED | OUTPATIENT
Start: 2020-01-23 | End: 2020-03-23

## 2020-02-27 ENCOUNTER — OFFICE VISIT (OUTPATIENT)
Dept: INTERNAL MEDICINE CLINIC | Age: 30
End: 2020-02-27

## 2020-02-27 ENCOUNTER — HOSPITAL ENCOUNTER (OUTPATIENT)
Dept: ULTRASOUND IMAGING | Age: 30
Discharge: HOME | End: 2020-02-27
Attending: NURSE PRACTITIONER
Payer: COMMERCIAL

## 2020-02-27 VITALS
HEART RATE: 98 BPM | TEMPERATURE: 98.3 F | WEIGHT: 168.4 LBS | HEIGHT: 66 IN | OXYGEN SATURATION: 98 % | BODY MASS INDEX: 27.06 KG/M2 | RESPIRATION RATE: 16 BRPM | SYSTOLIC BLOOD PRESSURE: 110 MMHG | DIASTOLIC BLOOD PRESSURE: 80 MMHG

## 2020-02-27 DIAGNOSIS — R10.10 PAIN OF UPPER ABDOMEN: Primary | ICD-10-CM

## 2020-02-27 DIAGNOSIS — R10.10 PAIN OF UPPER ABDOMEN: ICD-10-CM

## 2020-02-27 PROCEDURE — 76705 ECHO EXAM OF ABDOMEN: CPT

## 2020-02-27 NOTE — LETTER
NOTIFICATION RETURN TO WORK / SCHOOL 
 
2/27/2020 11:23 AM 
 
Ms. Ramon Wolf C/[address] Wh[phone] To Whom It May Concern: 
 
Ramon Wolf is currently under the care of [facility]. Please excuse her today as she is ill and cannot work. If there are questions or concerns please have the patient contact our office.  
 
 
 
Sincerely, 
 
 
Brandon Dawkins NP

## 2020-02-27 NOTE — PROGRESS NOTES
Subjective:      Tyesha Taylor is a 5[address] y.o. female who presents today for abdominal pain. Abdominal pain: symptoms started 2 days ago, they include abdominal pain in her umbilicus, she states pushing on her stomach is painful. She denies fevers, chills, diarrhea, nausea, vomiting. She has tried gas relief medication and it did not help. She notes symptoms are stable, no worsening. 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 Pain is described as a squeezing pain, nothing identified that improves it, certain positions make pain worse. Body mass index is 27.18 kg/m². Wt Readings from Last 3 Encounters:   02/27/20 168 lb 6.4 oz (76.4 kg)   12/04/19 169 lb 6.4 oz (76.8 kg)   10/10/19 168 lb 9.6 oz (76.5 kg)         Patient Active Problem List    Diagnosis Date Noted    Migraine with aura and without status migrainosus, not intractable 04/12/2018    Anemia 04/26/2017     Current Outpatient Medications   Medication Sig Dispense Refill    buPROPion XL (WELLBUTRIN XL) 300 mg XL tablet TAKE 1 TABLET BY MOUTH EVERY MORNING 30 Tab 0    levonorgestrel (MIRENA) 20 mcg/24 hr (5 years) IUD 1 Each by IntraUTERine route once.  buPROPion XL (WELLBUTRIN XL) 150 mg tablet TAKE 1 TABLET BY MOUTH EVERY MORNING 30 Tab 11    ALPRAZolam (XANAX) 0.5 mg tablet TAKE 1 TABLET BY MOUTH DAILY AS NEEDED FOR ANXIETY MAXIMUM:2//DAY START WITH ONE-HALF TABLET 20 Tab 0    aluminum chloride (HYPERCARE) 15 % (w/v) liqd Apply once at bedtime; once excessive sweating has stopped, decrease to once or twice weekly, or as needed. Wash treated area in the morning 35 mL 2    triamcinolone acetonide (KENALOG) 0.1 % ointment APPLY TO THE AFFECTED AREA TWICE DAILY 30 g 0    SUMAtriptan (IMITREX) 50 mg tablet Take 1 Tab by mouth once as needed for Migraine for up to 1 dose. 30 Tab 0     No Known Allergies  No past surgical history on file.      Review of Systems    A comprehensive review of systems was negative except for: Gastrointestinal: positive for abdominal pain     Objective:     Visit Vitals  /80 (BP 1 Location: Left arm, BP Patient Position: Sitting)   Pulse 98   Temp 98.3 °F (36.8 °C) (Oral)   Resp 16   Ht 5' 6\" (1.676 m)   Wt 168 lb 6.4 oz (76.4 kg)   SpO2 98%   BMI 27.18 kg/m²     General appearance: alert, cooperative, no distress, appears stated age  Head: Normocephalic, without obvious abnormality, atraumatic  Eyes: negative  Back: symmetric, no curvature. ROM normal. No CVA tenderness. Lungs: clear to auscultation bilaterally  Heart: regular rate and rhythm, S1, S2 normal, no murmur, click, rub or gallop  Abdomen: soft, pain with palpation just above umbilicus. BS hypoactive in all quadrants  Neurologic: Grossly normal  Nursing note and vitals reviewed  Assessment/Plan:     1. Pain of upper abdomen  - soft bland diet, tylenol for pain. She will schedule US. For any worsening she will go to the ED for CT.  - [facility]; Future       Follow-up and Dispositions    · Return if symptoms worsen or fail to improve. Advised her to call back or return to office if symptoms worsen/change/persist.  Discussed expected course/resolution/complications of diagnosis in detail with patient. Medication risks/benefits/costs/interactions/alternatives discussed with patient. She was given an after visit summary which includes diagnoses, current medications, & vitals. She expressed understanding with the diagnosis and plan.

## 2020-02-27 NOTE — PROGRESS NOTES
Chief Complaint   Patient presents with    Abdominal Pain     x 2 days     Reviewed record in preparation for visit and have obtained necessary documentation. Identified pt with two pt identifiers(name and ). Health Maintenance Due   Topic    DTaP/Tdap/Td series (2 - Tdap)    PAP AKA CERVICAL CYTOLOGY     Influenza Age 5 to Adult          Chief Complaint   Patient presents with    Abdominal Pain     x 2 days        Wt Readings from Last 3 Encounters:   20 168 lb 6.4 oz (76.4 kg)   19 169 lb 6.4 oz (76.8 kg)   10/10/19 168 lb 9.6 oz (76.5 kg)     Temp Readings from Last 3 Encounters:   20 98.3 °F (36.8 °C) (Oral)   19 98.4 °F (36.9 °C) (Oral)   19 98 °F (36.7 °C) (Oral)     BP Readings from Last 3 Encounters:   20 110/80   19 110/70   10/10/19 94/60     Pulse Readings from Last 3 Encounters:   20 98   19 89   10/10/19 88           Learning Assessment:  :     No flowsheet data found. Depression Screening:  :     3 most recent PHQ Screens 2020   Little interest or pleasure in doing things Not at all   Feeling down, depressed, irritable, or hopeless Not at all   Total Score PHQ 2 0   Trouble falling or staying asleep, or sleeping too much -   Feeling tired or having little energy -   Poor appetite, weight loss, or overeating -   Feeling bad about yourself - or that you are a failure or have let yourself or your family down -   Trouble concentrating on things such as school, work, reading, or watching TV -   Moving or speaking so slowly that other people could have noticed; or the opposite being so fidgety that others notice -   Thoughts of being better off dead, or hurting yourself in some way -   PHQ 9 Score -       Fall Risk Assessment:  :     No flowsheet data found. Abuse Screening:  :     No flowsheet data found.     Coordination of Care Questionnaire:  :     1) Have you been to an emergency room, urgent care clinic since your last visit? no   Hospitalized since your last visit? no             2) Have you seen or consulted any other health care providers outside of [address] [place] [address] since your last visit? no  (Include any pap smears or colon screenings in this section.)    3) Do you have an Advance Directive on file? no    4) Are you interested in receiving information on Advance Directives? NO      Patient is accompanied by self I have received verbal consent from [address] to discuss any/all medical information while they are present in the room. Reviewed record  In preparation for visit and have obtained necessary documentation.

## 2020-03-21 DIAGNOSIS — L30.9 ECZEMA, UNSPECIFIED TYPE: ICD-10-CM

## 2020-03-23 RX ORDER — TRIAMCINOLONE ACETONIDE 1 MG/G
OINTMENT TOPICAL
Qty: 30 G | Refills: 0 | Status: SHIPPED | OUTPATIENT
Start: 2020-03-23 | End: 2021-05-10

## 2020-03-23 RX ORDER — BUPROPION HYDROCHLORIDE 300 MG/1
TABLET ORAL
Qty: 30 TAB | Refills: 0 | Status: SHIPPED | OUTPATIENT
Start: 2020-03-23 | End: 2020-04-17

## 2020-04-03 ENCOUNTER — PATIENT MESSAGE (OUTPATIENT)
Dept: OTHER | Age: 30
End: 2020-04-03
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
## 2020-09-28 LAB
CHLAMYDIA, EXTERNAL: NORMAL
HBSAG, EXTERNAL: NORMAL
HIV, EXTERNAL: NON REACTIVE
N. GONORRHEA, EXTERNAL: NORMAL
RUBELLA, EXTERNAL: NORMAL
T. PALLIDUM, EXTERNAL: NON REACTIVE

## 2021-02-19 LAB — ANTIBODY SCREEN, EXTERNAL: NORMAL

## 2021-03-08 ENCOUNTER — OFFICE VISIT (OUTPATIENT)
Dept: URGENT CARE | Age: 31
End: 2021-03-08

## 2021-03-08 VITALS — TEMPERATURE: 97.9 F | OXYGEN SATURATION: 99 % | RESPIRATION RATE: 18 BRPM | HEART RATE: 96 BPM

## 2021-03-08 DIAGNOSIS — Z34.90 PREGNANCY, UNSPECIFIED GESTATIONAL AGE: Primary | ICD-10-CM

## 2021-03-08 DIAGNOSIS — R11.10 VOMITING, INTRACTABILITY OF VOMITING NOT SPECIFIED, PRESENCE OF NAUSEA NOT SPECIFIED, UNSPECIFIED VOMITING TYPE: ICD-10-CM

## 2021-03-08 NOTE — PROGRESS NOTES
Subjective: (As above and below)       This patient was seen in [facility] at [address] [place] [address] Urgent Care while outdoors at their vehicle due to COVID-19 pandemic with PPE and focused examination in order to decrease community viral transmission. The patient/guardian gave verbal consent to treat. Chief Complaint   Patient presents with    Other     vomited x 1 this morning     Claude Pay is a 31 y.o. female who presents today for covid 19 testing. Is currently pregnant. Has been experiencing GERD and has discussed with PCP and OBGN and has been improving. (see notes/encounters in EMR)  She vomited x 1 this morning after eating. She felt immediately better. No additional episodes of vomiting  There have been no abdominal pain, fever, chills, dizziness, urinary symptoms or body aches or diarrhea  Work requiring covid 19 test due to vomiting. Has no concerns otherwise    ROS negative for SOB, chest pain or decrease in fetal movement  Review of Systems - negative except as listed above    Reviewed PmHx, RxHx, FmHx, SocHx, AllgHx and updated in chart.   Family History   Problem Relation Age of Onset    Diabetes Mother        Past Medical History:   Diagnosis Date    Anemia       Social History     Socioeconomic History    Marital status: SINGLE     Spouse name: Not on file    Number of children: 0    Years of education: Not on file    Highest education level: Not on file   Tobacco Use    Smoking status: Former Smoker    Smokeless tobacco: Never Used    Tobacco comment: light social smoker   Substance and Sexual Activity    Alcohol use: Yes     Comment: socially in moderation    Drug use: No    Sexual activity: Yes     Partners: Male     Birth control/protection: Condom, Pill   Other Topics Concern    Caffeine Concern Yes     Comment: 3-4 cups of coffee a day, 1 glass of tea a day          Current Outpatient Medications   Medication Sig    buPROPion XL (WELLBUTRIN XL) 300 mg XL tablet TAKE 1 TABLET BY MOUTH EVERY MORNING    triamcinolone acetonide (KENALOG) 0.1 % ointment APPLY EXTERNALLY TO THE AFFECTED AREA TWICE DAILY    ALPRAZolam (XANAX) 0.5 mg tablet TAKE 1 TABLET BY MOUTH DAILY AS NEEDED FOR ANXIETY MAXIMUM:2//DAY START WITH ONE-HALF TABLET    aluminum chloride (HYPERCARE) 15 % (w/v) liqd Apply once at bedtime; once excessive sweating has stopped, decrease to once or twice weekly, or as needed. Wash treated area in the morning    SUMAtriptan (IMITREX) 50 mg tablet Take 1 Tab by mouth once as needed for Migraine for up to 1 dose.  levonorgestrel (MIRENA) 20 mcg/24 hr (5 years) IUD 1 Each by IntraUTERine route once. No current facility-administered medications for this visit. Objective:     Vitals:    03/08/21 0841 03/08/21 0856   Pulse: (!) 108 96   Resp: 18    Temp: 97.9 °F (36.6 °C)    SpO2: 99%        Physical Exam  General appearance  appears well hydrated and does not appear toxic, no acute distress  Eyes - EOMs intact. Non injected. No scleral icterus   Ears - no external swelling  Nose -  No purulent drainage  Mouth - OP clear without swelling, exudate or lesion. Mucus membranes moist. Uvula midline. Neck/Lymphatics  trachea midline, full AROM  Chest - Normal breathing effort no wheeze rales, rhonchi or diminishments bilaterally. Heart - RRR, no murmurs  Skin - no observable rashes or pallor  Neurologic- alert and oriented x 3  Psychiatric- normal mood, behavior and though content. Assessment/ Plan:     1. Pregnancy, unspecified gestational age    - NOVEL CORONAVIRUS (COVID-19)    2.  Vomiting, intractability of vomiting not specified, presence of nausea not specified, unspecified vomiting type    - NOVEL CORONAVIRUS (COVID-19)      Will test for COVID 19  Patient states she feels 100 percent better after single vomiting episode  To contact OBGYN immediatly for any return, new or worsening symptoms          Maurisio De La Paz NP

## 2021-03-08 NOTE — LETTER
NOTIFICATION RETURN TO WORK / SCHOOL 
 
3/8/2021 8:56 AM 
 
Ms. Ulices Malin LE[phone] To Whom It May Concern: 
 
Ulices Malin is currently under the care of [facility]. Was tested for COVID 19 today. Results pending. If there are questions or concerns please have the patient contact our office. Sincerely, GHE PROVIDER

## 2021-04-20 LAB — GRBS, EXTERNAL: NORMAL

## 2021-05-07 ENCOUNTER — HOSPITAL ENCOUNTER (OUTPATIENT)
Dept: PREADMISSION TESTING | Age: 31
Discharge: HOME | End: 2021-05-07
Payer: COMMERCIAL

## 2021-05-07 LAB — SARS-COV-2, COV2: NORMAL

## 2021-05-07 PROCEDURE — U0005 INFEC AGEN DETEC AMPLI PROBE: HCPCS

## 2021-05-08 ENCOUNTER — HOSPITAL ENCOUNTER (INPATIENT)
Age: 31
LOS: 2 days | Discharge: HOME | End: 2021-05-10
Attending: OBSTETRICS & GYNECOLOGY | Admitting: OBSTETRICS & GYNECOLOGY
Payer: COMMERCIAL

## 2021-05-08 ENCOUNTER — ANESTHESIA (OUTPATIENT)
Dept: LABOR AND DELIVERY | Age: 31
End: 2021-05-08
Payer: COMMERCIAL

## 2021-05-08 ENCOUNTER — ANESTHESIA EVENT (OUTPATIENT)
Dept: LABOR AND DELIVERY | Age: 31
End: 2021-05-08
Payer: COMMERCIAL

## 2021-05-08 DIAGNOSIS — Z98.891 HISTORY OF CESAREAN DELIVERY: Primary | ICD-10-CM

## 2021-05-08 PROBLEM — O42.90 PROM (PREMATURE RUPTURE OF MEMBRANES): Status: ACTIVE | Noted: 2021-05-08

## 2021-05-08 LAB
A1 MICROGLOB PLACENTAL VAG QL: POSITIVE
AMPHET UR QL SCN: NEGATIVE
BARBITURATES UR QL SCN: NEGATIVE
BASOPHILS # BLD: 0 K/UL (ref 0–0.1)
BASOPHILS NFR BLD: 0 % (ref 0–1)
BENZODIAZ UR QL: NEGATIVE
CANNABINOIDS UR QL SCN: NEGATIVE
COCAINE UR QL SCN: NEGATIVE
CONTROL LINE PRESENT?: NORMAL
DIFFERENTIAL METHOD BLD: ABNORMAL
DRUG SCRN COMMENT,DRGCM: NORMAL
EOSINOPHIL # BLD: 0.2 K/UL (ref 0–0.4)
EOSINOPHIL NFR BLD: 2 % (ref 0–7)
ERYTHROCYTE [DISTWIDTH] IN BLOOD BY AUTOMATED COUNT: 16.6 % (ref 11.5–14.5)
EXPIRATION DATE: NORMAL
HCT VFR BLD AUTO: 34.5 % (ref 35–47)
HGB BLD-MCNC: 10.7 G/DL (ref 11.5–16)
IMM GRANULOCYTES # BLD AUTO: 0.1 K/UL (ref 0–0.04)
IMM GRANULOCYTES NFR BLD AUTO: 1 % (ref 0–0.5)
INTERNAL NEGATIVE CONTROL: NORMAL
KIT LOT NO.: NORMAL
LYMPHOCYTES # BLD: 3.2 K/UL (ref 0.8–3.5)
LYMPHOCYTES NFR BLD: 23 % (ref 12–49)
MCH RBC QN AUTO: 22.4 PG (ref 26–34)
MCHC RBC AUTO-ENTMCNC: 31 G/DL (ref 30–36.5)
MCV RBC AUTO: 72.2 FL (ref 80–99)
METHADONE UR QL: NEGATIVE
MONOCYTES # BLD: 1.3 K/UL (ref 0–1)
MONOCYTES NFR BLD: 9 % (ref 5–13)
NEUTS SEG # BLD: 9.4 K/UL (ref 1.8–8)
NEUTS SEG NFR BLD: 65 % (ref 32–75)
NRBC # BLD: 0 K/UL (ref 0–0.01)
NRBC BLD-RTO: 0 PER 100 WBC
OPIATES UR QL: NEGATIVE
PCP UR QL: NEGATIVE
PLATELET # BLD AUTO: 222 K/UL (ref 150–400)
PMV BLD AUTO: 12.8 FL (ref 8.9–12.9)
RBC # BLD AUTO: 4.78 M/UL (ref 3.8–5.2)
SARS-COV-2, COV2NT: NOT DETECTED
WBC # BLD AUTO: 14.3 K/UL (ref 3.6–11)

## 2021-05-08 PROCEDURE — 74011000250 HC RX REV CODE- 250: Performed by: OBSTETRICS & GYNECOLOGY

## 2021-05-08 PROCEDURE — 74011000250 HC RX REV CODE- 250: Performed by: ANESTHESIOLOGY

## 2021-05-08 PROCEDURE — 75410000003 HC RECOV DEL/VAG/CSECN EA 0.5 HR

## 2021-05-08 PROCEDURE — 77030007866 HC KT SPN ANES BBMI -B: Performed by: ANESTHESIOLOGY

## 2021-05-08 PROCEDURE — 80307 DRUG TEST PRSMV CHEM ANLYZR: CPT

## 2021-05-08 PROCEDURE — 77010026065 HC OXYGEN MINIMUM MEDICAL AIR

## 2021-05-08 PROCEDURE — 36415 COLL VENOUS BLD VENIPUNCTURE: CPT

## 2021-05-08 PROCEDURE — 76010000391 HC C SECN FIRST 1 HR: Performed by: OBSTETRICS & GYNECOLOGY

## 2021-05-08 PROCEDURE — 74011250636 HC RX REV CODE- 250/636: Performed by: ANESTHESIOLOGY

## 2021-05-08 PROCEDURE — 76010000392 HC C SECN EA ADDL 0.5 HR: Performed by: OBSTETRICS & GYNECOLOGY

## 2021-05-08 PROCEDURE — 76060000033 HC ANESTHESIA 1 TO 1.5 HR: Performed by: OBSTETRICS & GYNECOLOGY

## 2021-05-08 PROCEDURE — 74011250636 HC RX REV CODE- 250/636: Performed by: OBSTETRICS & GYNECOLOGY

## 2021-05-08 PROCEDURE — 84112 EVAL AMNIOTIC FLUID PROTEIN: CPT | Performed by: OBSTETRICS & GYNECOLOGY

## 2021-05-08 PROCEDURE — 76060000078 HC EPIDURAL ANESTHESIA: Performed by: OBSTETRICS & GYNECOLOGY

## 2021-05-08 PROCEDURE — 65410000002 HC RM PRIVATE OB

## 2021-05-08 PROCEDURE — 75410000002 HC LABOR FEE PER 1 HR

## 2021-05-08 PROCEDURE — 99285 EMERGENCY DEPT VISIT HI MDM: CPT

## 2021-05-08 PROCEDURE — 85025 COMPLETE CBC W/AUTO DIFF WBC: CPT

## 2021-05-08 RX ORDER — SODIUM CHLORIDE 0.9 % (FLUSH) 0.9 %
5-40 SYRINGE (ML) INJECTION EVERY 8 HOURS
Status: DISCONTINUED | OUTPATIENT
Start: 2021-05-09 | End: 2021-05-10 | Stop reason: HOSPADM

## 2021-05-08 RX ORDER — OXYTOCIN 10 [USP'U]/ML
INJECTION, SOLUTION INTRAMUSCULAR; INTRAVENOUS AS NEEDED
Status: DISCONTINUED | OUTPATIENT
Start: 2021-05-08 | End: 2021-05-10 | Stop reason: HOSPADM

## 2021-05-08 RX ORDER — KETOROLAC TROMETHAMINE 30 MG/ML
30 INJECTION, SOLUTION INTRAMUSCULAR; INTRAVENOUS
Status: DISPENSED | OUTPATIENT
Start: 2021-05-08 | End: 2021-05-09

## 2021-05-08 RX ORDER — OXYTOCIN/RINGER'S LACTATE 30/500 ML
10 PLASTIC BAG, INJECTION (ML) INTRAVENOUS AS NEEDED
Status: DISCONTINUED | OUTPATIENT
Start: 2021-05-08 | End: 2021-05-10 | Stop reason: HOSPADM

## 2021-05-08 RX ORDER — SODIUM CHLORIDE, SODIUM LACTATE, POTASSIUM CHLORIDE, CALCIUM CHLORIDE 600; 310; 30; 20 MG/100ML; MG/100ML; MG/100ML; MG/100ML
1000 INJECTION, SOLUTION INTRAVENOUS CONTINUOUS
Status: DISCONTINUED | OUTPATIENT
Start: 2021-05-08 | End: 2021-05-10 | Stop reason: HOSPADM

## 2021-05-08 RX ORDER — MORPHINE SULFATE 0.5 MG/ML
INJECTION, SOLUTION EPIDURAL; INTRATHECAL; INTRAVENOUS AS NEEDED
Status: DISCONTINUED | OUTPATIENT
Start: 2021-05-08 | End: 2021-05-10 | Stop reason: HOSPADM

## 2021-05-08 RX ORDER — SODIUM CHLORIDE 0.9 % (FLUSH) 0.9 %
5-40 SYRINGE (ML) INJECTION AS NEEDED
Status: DISCONTINUED | OUTPATIENT
Start: 2021-05-08 | End: 2021-05-10 | Stop reason: HOSPADM

## 2021-05-08 RX ORDER — ONDANSETRON 2 MG/ML
INJECTION INTRAMUSCULAR; INTRAVENOUS AS NEEDED
Status: DISCONTINUED | OUTPATIENT
Start: 2021-05-08 | End: 2021-05-09

## 2021-05-08 RX ORDER — SODIUM CHLORIDE, SODIUM LACTATE, POTASSIUM CHLORIDE, CALCIUM CHLORIDE 600; 310; 30; 20 MG/100ML; MG/100ML; MG/100ML; MG/100ML
125 INJECTION, SOLUTION INTRAVENOUS CONTINUOUS
Status: DISCONTINUED | OUTPATIENT
Start: 2021-05-09 | End: 2021-05-10 | Stop reason: HOSPADM

## 2021-05-08 RX ORDER — SODIUM CHLORIDE 0.9 % (FLUSH) 0.9 %
5-40 SYRINGE (ML) INJECTION EVERY 8 HOURS
Status: DISCONTINUED | OUTPATIENT
Start: 2021-05-08 | End: 2021-05-10 | Stop reason: HOSPADM

## 2021-05-08 RX ORDER — SIMETHICONE 80 MG
80 TABLET,CHEWABLE ORAL AS NEEDED
Status: DISCONTINUED | OUTPATIENT
Start: 2021-05-08 | End: 2021-05-10 | Stop reason: HOSPADM

## 2021-05-08 RX ORDER — SODIUM CHLORIDE, SODIUM LACTATE, POTASSIUM CHLORIDE, CALCIUM CHLORIDE 600; 310; 30; 20 MG/100ML; MG/100ML; MG/100ML; MG/100ML
INJECTION, SOLUTION INTRAVENOUS
Status: DISCONTINUED | OUTPATIENT
Start: 2021-05-08 | End: 2021-05-10 | Stop reason: HOSPADM

## 2021-05-08 RX ORDER — ONDANSETRON 2 MG/ML
4 INJECTION INTRAMUSCULAR; INTRAVENOUS
Status: DISCONTINUED | OUTPATIENT
Start: 2021-05-08 | End: 2021-05-09

## 2021-05-08 RX ORDER — OXYTOCIN/RINGER'S LACTATE 30/500 ML
87.3 PLASTIC BAG, INJECTION (ML) INTRAVENOUS AS NEEDED
Status: DISCONTINUED | OUTPATIENT
Start: 2021-05-08 | End: 2021-05-10 | Stop reason: HOSPADM

## 2021-05-08 RX ORDER — NALOXONE HYDROCHLORIDE 0.4 MG/ML
0.4 INJECTION, SOLUTION INTRAMUSCULAR; INTRAVENOUS; SUBCUTANEOUS AS NEEDED
Status: DISCONTINUED | OUTPATIENT
Start: 2021-05-08 | End: 2021-05-09

## 2021-05-08 RX ORDER — VITAMIN A ACETATE, BETA CAROTENE, ASCORBIC ACID, CHOLECALCIFEROL, .ALPHA.-TOCOPHEROL ACETATE, DL-, THIAMINE MONONITRATE, RIBOFLAVIN, NIACINAMIDE, PYRIDOXINE HYDROCHLORIDE, FOLIC ACID, CYANOCOBALAMIN, CALCIUM CARBONATE, FERROUS FUMARATE, ZINC OXIDE, CUPRIC OXIDE 3080; 12; 120; 400; 1; 1.84; 3; 20; 22; 920; 25; 200; 27; 10; 2 [IU]/1; UG/1; MG/1; [IU]/1; MG/1; MG/1; MG/1; MG/1; MG/1; [IU]/1; MG/1; MG/1; MG/1; MG/1; MG/1
1 TABLET, FILM COATED ORAL DAILY
COMMUNITY
End: 2022-03-25 | Stop reason: ALTCHOICE

## 2021-05-08 RX ORDER — BUPIVACAINE HYDROCHLORIDE 7.5 MG/ML
INJECTION, SOLUTION EPIDURAL; RETROBULBAR AS NEEDED
Status: DISCONTINUED | OUTPATIENT
Start: 2021-05-08 | End: 2021-05-10 | Stop reason: HOSPADM

## 2021-05-08 RX ORDER — OXYCODONE HYDROCHLORIDE 5 MG/1
5 TABLET ORAL
Status: DISCONTINUED | OUTPATIENT
Start: 2021-05-08 | End: 2021-05-10 | Stop reason: HOSPADM

## 2021-05-08 RX ADMIN — ONDANSETRON HYDROCHLORIDE 4 MG: 2 INJECTION, SOLUTION INTRAMUSCULAR; INTRAVENOUS at 22:38

## 2021-05-08 RX ADMIN — PHENYLEPHRINE HYDROCHLORIDE 120 MCG: 10 INJECTION INTRAVENOUS at 22:04

## 2021-05-08 RX ADMIN — BUPIVACAINE HYDROCHLORIDE 10.5 MG: 7.5 INJECTION, SOLUTION EPIDURAL; RETROBULBAR at 21:50

## 2021-05-08 RX ADMIN — SODIUM CHLORIDE, POTASSIUM CHLORIDE, SODIUM LACTATE AND CALCIUM CHLORIDE: 600; 310; 30; 20 INJECTION, SOLUTION INTRAVENOUS at 22:17

## 2021-05-08 RX ADMIN — SODIUM CHLORIDE, POTASSIUM CHLORIDE, SODIUM LACTATE AND CALCIUM CHLORIDE 1000 ML: 600; 310; 30; 20 INJECTION, SOLUTION INTRAVENOUS at 21:19

## 2021-05-08 RX ADMIN — PHENYLEPHRINE HYDROCHLORIDE 80 MCG: 10 INJECTION INTRAVENOUS at 21:58

## 2021-05-08 RX ADMIN — PHENYLEPHRINE HYDROCHLORIDE 80 MCG: 10 INJECTION INTRAVENOUS at 22:19

## 2021-05-08 RX ADMIN — PHENYLEPHRINE HYDROCHLORIDE 80 MCG: 10 INJECTION INTRAVENOUS at 22:22

## 2021-05-08 RX ADMIN — SODIUM CHLORIDE, POTASSIUM CHLORIDE, SODIUM LACTATE AND CALCIUM CHLORIDE 1000 ML: 600; 310; 30; 20 INJECTION, SOLUTION INTRAVENOUS at 20:25

## 2021-05-08 RX ADMIN — MORPHINE SULFATE 0.5 MG: 0.5 INJECTION, SOLUTION EPIDURAL; INTRATHECAL; INTRAVENOUS at 21:50

## 2021-05-08 RX ADMIN — PHENYLEPHRINE HYDROCHLORIDE 80 MCG: 10 INJECTION INTRAVENOUS at 22:00

## 2021-05-08 RX ADMIN — CEFAZOLIN SODIUM 2 G: 1 INJECTION, POWDER, FOR SOLUTION INTRAMUSCULAR; INTRAVENOUS at 21:26

## 2021-05-08 RX ADMIN — PHENYLEPHRINE HYDROCHLORIDE 120 MCG: 10 INJECTION INTRAVENOUS at 22:02

## 2021-05-08 RX ADMIN — PHENYLEPHRINE HYDROCHLORIDE 120 MCG: 10 INJECTION INTRAVENOUS at 22:10

## 2021-05-08 RX ADMIN — PHENYLEPHRINE HYDROCHLORIDE 120 MCG: 10 INJECTION INTRAVENOUS at 22:25

## 2021-05-08 RX ADMIN — OXYTOCIN 20 UNITS: 10 INJECTION, SOLUTION INTRAMUSCULAR; INTRAVENOUS at 22:17

## 2021-05-08 NOTE — PROGRESS NOTES
Received pt from home, c/o LOF since about 2 hours ago, pt of Dr Peter Garcia, NOAM 5/14/21 pt is a scheduled C/S on Tuesday for repeat.  EFMX2 applied, consent up    [address], seen grossly ruptured     2000 Dr Michael Fuller called in room to see pt, Dr Jessika Torres made aware of pt's last meal at 4 pm and agree to proceed with C/S, OR opened     2030 Skin prep done    2110 Care turned over to [address] assumed for post section recovery

## 2021-05-09 LAB
HCT VFR BLD AUTO: 32 % (ref 35–47)
HGB BLD-MCNC: 10.1 G/DL (ref 11.5–16)

## 2021-05-09 PROCEDURE — 2709999900 HC NON-CHARGEABLE SUPPLY

## 2021-05-09 PROCEDURE — 65410000002 HC RM PRIVATE OB

## 2021-05-09 PROCEDURE — 74011250636 HC RX REV CODE- 250/636: Performed by: OBSTETRICS & GYNECOLOGY

## 2021-05-09 PROCEDURE — 74011250637 HC RX REV CODE- 250/637: Performed by: OBSTETRICS & GYNECOLOGY

## 2021-05-09 PROCEDURE — 74011000250 HC RX REV CODE- 250: Performed by: OBSTETRICS & GYNECOLOGY

## 2021-05-09 PROCEDURE — 36415 COLL VENOUS BLD VENIPUNCTURE: CPT

## 2021-05-09 PROCEDURE — 74011250637 HC RX REV CODE- 250/637: Performed by: ANESTHESIOLOGY

## 2021-05-09 PROCEDURE — 77030033065 HC RET VISC GLSMN DISP CARF -B

## 2021-05-09 PROCEDURE — 85014 HEMATOCRIT: CPT

## 2021-05-09 PROCEDURE — 77030018809 HC RETRCTR ALXSO DISP AMR -B

## 2021-05-09 RX ORDER — ONDANSETRON 2 MG/ML
4 INJECTION INTRAMUSCULAR; INTRAVENOUS
Status: DISCONTINUED | OUTPATIENT
Start: 2021-05-09 | End: 2021-05-10 | Stop reason: HOSPADM

## 2021-05-09 RX ORDER — ACETAMINOPHEN 325 MG/1
650 TABLET ORAL
Status: DISCONTINUED | OUTPATIENT
Start: 2021-05-09 | End: 2021-05-10 | Stop reason: HOSPADM

## 2021-05-09 RX ORDER — OXYCODONE AND ACETAMINOPHEN 5; 325 MG/1; MG/1
1-2 TABLET ORAL
Status: DISCONTINUED | OUTPATIENT
Start: 2021-05-09 | End: 2021-05-10 | Stop reason: HOSPADM

## 2021-05-09 RX ORDER — KETOROLAC TROMETHAMINE 30 MG/ML
15 INJECTION, SOLUTION INTRAMUSCULAR; INTRAVENOUS
Status: DISCONTINUED | OUTPATIENT
Start: 2021-05-09 | End: 2021-05-10 | Stop reason: HOSPADM

## 2021-05-09 RX ORDER — IBUPROFEN 600 MG/1
600 TABLET ORAL
Status: DISCONTINUED | OUTPATIENT
Start: 2021-05-09 | End: 2021-05-10 | Stop reason: HOSPADM

## 2021-05-09 RX ORDER — NALOXONE HYDROCHLORIDE 0.4 MG/ML
0.4 INJECTION, SOLUTION INTRAMUSCULAR; INTRAVENOUS; SUBCUTANEOUS AS NEEDED
Status: DISCONTINUED | OUTPATIENT
Start: 2021-05-09 | End: 2021-05-10 | Stop reason: HOSPADM

## 2021-05-09 RX ORDER — DIPHENHYDRAMINE HYDROCHLORIDE 50 MG/ML
25-50 INJECTION, SOLUTION INTRAMUSCULAR; INTRAVENOUS
Status: DISCONTINUED | OUTPATIENT
Start: 2021-05-09 | End: 2021-05-10 | Stop reason: HOSPADM

## 2021-05-09 RX ADMIN — ONDANSETRON 4 MG: 2 INJECTION INTRAMUSCULAR; INTRAVENOUS at 00:55

## 2021-05-09 RX ADMIN — KETOROLAC TROMETHAMINE 30 MG: 30 INJECTION, SOLUTION INTRAMUSCULAR; INTRAVENOUS at 00:55

## 2021-05-09 RX ADMIN — PROCHLORPERAZINE EDISYLATE 10 MG: 5 INJECTION INTRAMUSCULAR; INTRAVENOUS at 08:06

## 2021-05-09 RX ADMIN — SODIUM CHLORIDE, POTASSIUM CHLORIDE, SODIUM LACTATE AND CALCIUM CHLORIDE 125 ML/HR: 600; 310; 30; 20 INJECTION, SOLUTION INTRAVENOUS at 08:56

## 2021-05-09 RX ADMIN — SODIUM CHLORIDE, POTASSIUM CHLORIDE, SODIUM LACTATE AND CALCIUM CHLORIDE 1000 ML: 600; 310; 30; 20 INJECTION, SOLUTION INTRAVENOUS at 08:07

## 2021-05-09 RX ADMIN — ACETAMINOPHEN 650 MG: 325 TABLET ORAL at 23:05

## 2021-05-09 RX ADMIN — IBUPROFEN 600 MG: 600 TABLET, FILM COATED ORAL at 18:06

## 2021-05-09 NOTE — H&P
OB History & Physical    Name: Spencer Antony MRN: [id number]  SSN: [id number]    YOB: 1990  Age: 31 y.o. Sex: female      Subjective:     Chief complaint: leaking fluid    History of Present Illness: Spencer Antony is a 31 y.o. female  with an estimated gestational age of 36w3d with Estimated Date of Delivery: 21. Patient complains of leaking fluid. She was scheduled for repeat  section this week. She last ate a burger about four hours ago. She has no other concerns. She has otherwise had an uncomplicated pregnancy.   OB History        2    Para   1    Term   1       0    AB   0    Living   1       SAB   0    TAB   0    Ectopic   0    Molar        Multiple   0    Live Births   1              Past Medical History:   Diagnosis Date    Anemia      Past Surgical History:   Procedure Laterality Date    HX  SECTION       Social History     Occupational History    Not on file   Tobacco Use    Smoking status: Former Smoker    Smokeless tobacco: Never Used    Tobacco comment: light social smoker   Substance and Sexual Activity    Alcohol use: Yes     Comment: socially in moderation    Drug use: No    Sexual activity: Yes     Partners: Male     Birth control/protection: Condom, Pill     Family History   Problem Relation Age of Onset    Diabetes Mother     Heart Disease Father        No Known Allergies     Medications: Prenatal vitamin, prilosec, iron    ROS: negative    OBHx: - prior  section for non reassuring fetal heart tones    Objective:     Vitals:    Vitals:    21   BP:  116/68    Pulse:  (!) 111    Resp:  20    Temp:  98.2 °F (36.8 °C)    SpO2: 97%     Weight:   95.3 kg (210 lb)   Height:   5' 7\" (1.702 m)      Temp (24hrs), Av.2 °F (36.8 °C), Min:98.2 °F (36.8 °C), Max:98.2 °F (36.8 °C)    BP  Min: 116/68  Max: 116/68     Physical Exam  General: in NAD  HEENT: normocephalic    Abdomen: Gravid, soft, nontender, no abnormal masses, rebound, rigidity, guarding  Fundus: soft, nontender  Pelvic:Cervical Exam: not checked  Uterine Activity: uterine irritability with some contractions  Membranes: gross evidence of ROM  Fetal Heart Rate: 120'sadequate variability and reactivity; no significant abnormal decelerations    Labs: amnisure +    Patient Active Problem List   Diagnosis Code    Anemia D64.9    Migraine with aura and without status migrainosus, not intractable G43. 109    PROM (premature rupture of membranes) O42.90    History of  delivery Z98.891     Assessment and Plan:      @ 39w1d with PROM for repeat  section    1. IUP- category 1    2. PROM- patient desires to proceed with repeat  section. She does not want a trial of labor. 3. History of  section- discussed risks including but not limited to: bleeding, infection, damage to surrounding structures, blood vessels or nerves, pain, blood transfusion, hysterectomy, DVT, stroke or death. She desires to proceed and accepts blood transfusion if needed.    Ancef for prophylaxis  SCDs for DVT prophylaxis      Signed By:  Saeed Dumont MD     May 8, 2021

## 2021-05-09 NOTE — PROGRESS NOTES
Chiara pad/bedpad changed and chiara care done    0130 Bedside and Verbal shift change report given to RN Virgen Frost (oncoming nurse) by Mini Nath RN  (offgoing nurse). Report given with Coco OAKLEY and MAR.

## 2021-05-09 NOTE — PROGRESS NOTES
Post-Operative  Day 1    Abe Fernandes     Assessment: Post-Op day 1, stable    Plan:   1. Routine post-operative care. Add phenergan for nausea. Bolus ivf, urine concentrated and vomiting. 2. circ tomorrow due to poor feeding. Information for the patient's :  Riya Tan [[id number]]   , Low Transverse    Patient doing well without significant complaint. Continued nausea and vomiting ,  no flatus, hill in place. Vitals:  Visit Vitals  /68   Pulse 95   Temp 97.8 °F (36.6 °C)   Resp 16   Ht 5' 7\" (1.702 m)   Wt 95.3 kg (210 lb)   LMP  (LMP Unknown)   SpO2 98%   Breastfeeding Yes   BMI 32.89 kg/m²     Temp (24hrs), Av.1 °F (36.7 °C), Min:97.8 °F (36.6 °C), Max:98.4 °F (36.9 °C)      Last 24hr Input/Output:    Intake/Output Summary (Last 24 hours) at 2021 0738  Last data filed at 2021 0615  Gross per 24 hour   Intake --   Output 2428 ml   Net -2428 ml          Exam:        Patient without distress. Lungs clear. Abdomen, bowel sounds present, soft, expected tenderness, fundus firm Wound dressing intact     Perineum normal lochia noted               Lower extremities are negative for swelling, cords or tenderness.     Labs:   Lab Results   Component Value Date/Time    WBC 14.3 (H) 2021 08:25 PM    WBC 7.9 2018 09:21 AM    WBC 6.6 2017 10:05 AM    WBC 20.5 (H) 2014 12:00 PM    WBC 22.0 (H) 2014 06:00 AM    WBC 11.3 (H) 2014 12:15 PM    WBC 7.8 2013 03:10 PM    HGB 10.7 (L) 2021 08:25 PM    HGB 12.4 2018 09:21 AM    HGB 12.3 2017 10:05 AM    HGB 8.8 (L) 2014 12:00 PM    HGB 9.2 (L) 2014 06:00 AM    HGB 10.3 (L) 2014 12:15 PM    HGB 10.8 (L) 2013 03:10 PM    HCT 34.5 (L) 2021 08:25 PM    HCT 41.0 2018 09:21 AM    HCT 39.6 2017 10:05 AM    HCT 28.1 (L) 2014 12:00 PM    HCT 29.9 (L) 2014 06:00 AM    HCT 33.2 (L) 2014 12:15 PM    HCT 32.9 (L) 07/19/2013 03:10 PM    PLATELET 174 91/40/1181 08:25 PM    PLATELET 735 44/13/6512 09:21 AM    PLATELET 891 10/92/9749 10:05 AM    PLATELET 515 48/37/6548 12:00 PM    PLATELET 976 94/99/0012 06:00 AM    PLATELET 363 27/67/3915 12:15 PM    PLATELET 766 70/97/0038 03:10 PM       Recent Results (from the past 24 hour(s))   RUPTURE OF FETAL MEMBRANES, POC    Collection Time: 05/08/21  7:45 PM   Result Value Ref Range    Rupture of fetal membrane Positive Negative    Control line present? Acceptable     Internal negative control Acceptable     Kit Lot No. [id number]     Expiration date 2024-01-10    CBC WITH AUTOMATED DIFF    Collection Time: 05/08/21  8:25 PM   Result Value Ref Range    WBC 14.3 (H) 3.6 - 11.0 K/uL    RBC 4.78 3.80 - 5.20 M/uL    HGB 10.7 (L) 11.5 - 16.0 g/dL    HCT 34.5 (L) 35.0 - 47.0 %    MCV 72.2 (L) 80.0 - 99.0 FL    MCH 22.4 (L) 26.0 - 34.0 PG    MCHC 31.0 30.0 - 36.5 g/dL    RDW 16.6 (H) 11.5 - 14.5 %    PLATELET 723 689 - 994 K/uL    MPV 12.8 8.9 - 12.9 FL    NRBC 0.0 0  WBC    ABSOLUTE NRBC 0.00 0.00 - 0.01 K/uL    NEUTROPHILS 65 32 - 75 %    LYMPHOCYTES 23 12 - 49 %    MONOCYTES 9 5 - 13 %    EOSINOPHILS 2 0 - 7 %    BASOPHILS 0 0 - 1 %    IMMATURE GRANULOCYTES 1 (H) 0.0 - 0.5 %    ABS. NEUTROPHILS 9.4 (H) 1.8 - 8.0 K/UL    ABS. LYMPHOCYTES 3.2 0.8 - 3.5 K/UL    ABS. MONOCYTES 1.3 (H) 0.0 - 1.0 K/UL    ABS. EOSINOPHILS 0.2 0.0 - 0.4 K/UL    ABS. BASOPHILS 0.0 0.0 - 0.1 K/UL    ABS. IMM.  GRANS. 0.1 (H) 0.00 - 0.04 K/UL    DF AUTOMATED     DRUG SCREEN, URINE    Collection Time: 05/08/21  8:25 PM   Result Value Ref Range    AMPHETAMINES Negative NEG      BARBITURATES Negative NEG      BENZODIAZEPINES Negative NEG      COCAINE Negative NEG      METHADONE Negative NEG      OPIATES Negative NEG      PCP(PHENCYCLIDINE) Negative NEG      THC (TH-CANNABINOL) Negative NEG      Drug screen comment (NOTE)

## 2021-05-09 NOTE — PROGRESS NOTES
Bedside and Verbal shift change report given to ZAHRA Ybarra (oncoming nurse) by Aissatou Rojo. Enrike and MELA Jewell (offgoing nurse). Report included the following information SBAR, Kardex, Intake/Output, MAR and Recent Results.

## 2021-05-09 NOTE — ANESTHESIA PROCEDURE NOTES
Spinal Block    Performed by: Marley Griffith MD  Authorized by: Marley Griffith MD                 Spinal Procedure Note    Risk and benefits were discussed with the patient and plans are to proceed. Patient was placed in the seated position. The back was prepped at the lumbar region with Duraprep. 3 mL 1% lidocaine was used as local.    Epidural space identified with 17 gauge Touhy @ L3 - L4    A 25 g pencil point spinal needle was placed  and advanced until CSF was obtained. 1.4 mL 0.75% Spinal Marcaine with dextrose + 0.5 mg Duramorph was deposited into the CSF. - paresthesia.

## 2021-05-09 NOTE — PROGRESS NOTES
Duramorph Follow-Up Note    1 Day Post-Op sp Procedure(s):   SECTION. /67   Pulse 96   Temp 36.9 °C (98.4 °F)   Resp 16   Ht 5' 7\" (1.702 m)   Wt 95.3 kg (210 lb)   LMP  (LMP Unknown)   SpO2 98%   Breastfeeding Yes   BMI 32.89 kg/m² . Patient is POD-1 S/P intrathecal duramorph. Pain is well controlled  Patient reports no headache, fever, weakness or numbness. Epidural/spinal tap site is clean, dry and intact. No obvious Anesthesia complications noted. Plan:    Pain management as per primary service.

## 2021-05-09 NOTE — PROGRESS NOTES
Bedside shift change report given to Esthela Jacob (oncoming nurse) by SETH Ramirez (offgoing nurse).  Report included the following information SBAR, Kardex and OR Summary.

## 2021-05-09 NOTE — ANESTHESIA PREPROCEDURE EVALUATION
Anesthetic History   No history of anesthetic complications            Review of Systems / Medical History  Patient summary reviewed, nursing notes reviewed and pertinent labs reviewed    Pulmonary  Within defined limits                 Neuro/Psych   Within defined limits           Cardiovascular  Within defined limits                Exercise tolerance: >4 METS     GI/Hepatic/Renal  Within defined limits              Endo/Other  Within defined limits           Other Findings   Comments: IUP  Prior c/section    Pt ate burger at 1600  Hgb 10.7         Physical Exam    Airway  Mallampati: III  TM Distance: 4 - 6 cm  Neck ROM: normal range of motion   Mouth opening: Normal     Cardiovascular  Regular rate and rhythm,  S1 and S2 normal,  no murmur, click, rub, or gallop             Dental  No notable dental hx       Pulmonary  Breath sounds clear to auscultation               Abdominal  GI exam deferred       Other Findings            Anesthetic Plan    ASA: 2, emergent  Anesthesia type: spinal            Anesthetic plan and risks discussed with: Patient

## 2021-05-09 NOTE — OP NOTES
Operative Note    Name: Yelena Mireles   Medical Record Number: [id number]   YOB: 1990  Today's Date: May 8, 2021      Pre-operative Diagnosis: Repeat  section 2. PROM    Post-operative Diagnosis: same    Operation: Low transverse, extension on the maternal left side of the hysterotomy toward the cervix Procedure(s):  [facility]    Surgeon: Dr Greg Mitchell    Assistant: Rosa Fox    Anesthesia: Spinal    EBL: per Cintiadarshan Jack, estimate: 500mL    Prophylactic Antibiotics: Ancef  DVT Prophylaxis: Sequential Compression Devices         Fetal Description: valenzuela     Birth Information: Male infant, 200g, 21.5 inches, Apgars 8,9 born at 0642    Umbilical Cord: 2 vessels present    Placenta:  expressed    Specimens: none           Complications:  None    Implants: none    Procedure Detail:    After proper patient identification and consent, the patient was taken to the operating room, where epidural anesthesia was surgically dosed and found to be adequate. Duckworth catheter had been placed during labor using sterile technique. The patient was prepped and draped in the normal sterile fashion. Surgical time-out was completed. The abdomen was entered using the Pfannenstiel technique. The peritoneum was entered sharply well superior to the bladder without any apparent injury. The bladder was pulled superiorly and was carefully dissected off the anterior uterine wall. Kam retractor was placed without difficulty. A low transverse uterine incision was made with the scalpel and extended with blunt finger dissection. Amniotomy was performed and the fluid was medium amount clear. The fetal head was low in the pelvis and brought to the hysterotomy. It did not delivery immediately with pressure. The rectus muscles were cut midline to allow for additional room and the [employer] removed. The vacuum was used to assist with delivery of the head atraumatically with 1 pop off.   The nose and mouth were suctioned. The cord was clamped and cut and the baby was handed off to [facility] in attendance. Placenta was then removed from the uterus. The uterus was curettaged with a moist lap pad and cleared of all clots and debris. The uterine incision was closed with 0 Vicryl, starting with repair of the uterine extension, double layer in running locked and imbricating fashion with 0 Monocryl with adequate hemostasis subsequently noted. The peritoneum was closed with 3-0 Vicryl in a running fashion. The rectus muscle was re approximated with 3-0 Vicryl. The fascia was closed with 0 Vicryl. Subcutaneous tissue was reapproximated with 3-0 vicryl. The skin was closed with a 3-0 monocryl subcuticular closure. The patient tolerated the procedure adequately. The hill continued to drain clear yellow urine. Sponge, lap, and needle counts were correct times three and the patient and baby were taken to recovery/postpartum room in stable condition.          Diogenes Beyer MD  May 8, 2021  10:50 PM

## 2021-05-10 VITALS
RESPIRATION RATE: 16 BRPM | OXYGEN SATURATION: 98 % | DIASTOLIC BLOOD PRESSURE: 71 MMHG | HEART RATE: 111 BPM | WEIGHT: 210 LBS | HEIGHT: 67 IN | SYSTOLIC BLOOD PRESSURE: 110 MMHG | TEMPERATURE: 97.7 F | BODY MASS INDEX: 32.96 KG/M2

## 2021-05-10 PROCEDURE — 74011250637 HC RX REV CODE- 250/637: Performed by: OBSTETRICS & GYNECOLOGY

## 2021-05-10 PROCEDURE — 74011250637 HC RX REV CODE- 250/637: Performed by: ANESTHESIOLOGY

## 2021-05-10 RX ORDER — OXYCODONE AND ACETAMINOPHEN 5; 325 MG/1; MG/1
1 TABLET ORAL
Qty: 20 TAB | Refills: 0 | Status: SHIPPED | OUTPATIENT
Start: 2021-05-10 | End: 2021-05-17

## 2021-05-10 RX ORDER — DOCUSATE SODIUM 100 MG/1
100 CAPSULE, LIQUID FILLED ORAL
Qty: 60 CAP | Refills: 0 | Status: SHIPPED | OUTPATIENT
Start: 2021-05-10 | End: 2022-03-25 | Stop reason: ALTCHOICE

## 2021-05-10 RX ORDER — IBUPROFEN 600 MG/1
600 TABLET ORAL
Qty: 60 TAB | Refills: 0 | Status: SHIPPED | OUTPATIENT
Start: 2021-05-10 | End: 2022-03-25 | Stop reason: ALTCHOICE

## 2021-05-10 RX ADMIN — ACETAMINOPHEN 650 MG: 325 TABLET ORAL at 15:24

## 2021-05-10 RX ADMIN — IBUPROFEN 600 MG: 600 TABLET, FILM COATED ORAL at 09:22

## 2021-05-10 RX ADMIN — IBUPROFEN 600 MG: 600 TABLET, FILM COATED ORAL at 01:38

## 2021-05-10 NOTE — ROUTINE PROCESS
Verbal shift change report given to SERGEY Hernandez RN (oncoming nurse) by Dewey Villar. Coletta Bloch, RN (offgoing nurse). Report included the following information SBAR, Kardex, Intake/Output, MAR, and Recent Results.

## 2021-05-10 NOTE — ANESTHESIA POSTPROCEDURE EVALUATION
Procedure(s):   SECTION. spinal    Anesthesia Post Evaluation        Patient location during evaluation: PACU  Note status: Adequate. Level of consciousness: responsive to verbal stimuli and sleepy but conscious  Pain management: satisfactory to patient  Airway patency: patent  Anesthetic complications: no  Cardiovascular status: acceptable  Respiratory status: acceptable  Hydration status: acceptable  Comments: +Post-Anesthesia Evaluation and Assessment    Patient: Safia Rivas MRN: [id number]  SSN: [id number]   YOB: 1990  Age: 31 y.o. Sex: female      Cardiovascular Function/Vital Signs    /71   Pulse (!) 111   Temp 36.5 °C (97.7 °F)   Resp 16   Ht 5' 7\" (1.702 m)   Wt 95.3 kg (210 lb)   SpO2 98%   Breastfeeding Yes   BMI 32.89 kg/m²     Patient is status post Procedure(s):   SECTION. Nausea/Vomiting: Controlled. Postoperative hydration reviewed and adequate. Pain:  Pain Scale 1: Numeric (0 - 10) (05/10/21 0925)  Pain Intensity 1: 6 (05/10/21 0925)   Managed. Neurological Status:   Neuro (WDL): Within Defined Limits (21)   At baseline. Mental Status and Level of Consciousness: Arousable. Pulmonary Status:   O2 Device: None (Room air) (21)   Adequate oxygenation and airway patent. Complications related to anesthesia: None    Post-anesthesia assessment completed. No concerns.     Signed By: Bhavana Lopez MD    5/10/2021  Post anesthesia nausea and vomiting:  controlled      INITIAL Post-op Vital signs:   Vitals Value Taken Time   /71 05/10/21 0925   Temp 36.5 °C (97.7 °F) 05/10/21 0925   Pulse 111 05/10/21 0925   Resp 16 05/10/21 0925   SpO2 98 % 21 1606

## 2021-05-10 NOTE — DISCHARGE SUMMARY
Obstetrical Discharge Summary     Name: Mauro Carey MRN: [id number]  SSN: [id number]    YOB: 1990  Age: 31 y.o. Sex: female      Admit Date: 2021    Discharge Date: 5/10/2021     Admitting Physician: Jeremiah Glasgow MD     Attending Physician: Radha Rai, *     Admission Diagnoses: PROM (premature rupture of membranes) [O42.90]  History of  delivery [Z98.891]    Procedure Performed:  Procedure(s):   SECTION      Discharge Diagnoses:   Information for the patient's :  Aram Cabrera [[id number]]   Delivery of a 3.875 kg male infant via , Low Transverse on 2021 at 10:16 PM  by Obdulio Shaw. Apgars were 8  and 9 . Additional Diagnoses:   Hospital Problems  Date Reviewed: 2021          Codes Class Noted POA    PROM (premature rupture of membranes) ICD-10-CM: O42.90  ICD-9-CM: 658.10  2021 Unknown        History of  delivery ICD-10-CM: Z98.891  ICD-9-CM: V45.89  2021 Unknown             Lab Results   Component Value Date/Time    Rubella, External Imm 2020    GrBStrep, External neg 2021       Hospital Course: RLTCS p term PROM. Hgb 10.7>10.1. Normal hospital course following the delivery. Discharged on POD2 meeting all goals. Patient Disposition: Home      Followup Care:  Discharge Condition: good  No sex for 6 weeks, No driving while on analgesics and No heavy lifting for 6 weeks  Regular Diet  Keep wound clean and dry    Patient Instructions:   Current Discharge Medication List      START taking these medications    Details   ibuprofen (MOTRIN) 600 mg tablet Take 1 Tab by mouth every six (6) hours as needed for Pain. Qty: 60 Tab, Refills: 0      oxyCODONE-acetaminophen (Percocet) 5-325 mg per tablet Take 1 Tab by mouth every four (4) hours as needed for Pain for up to 7 days. Max Daily Amount: 6 Tabs.   Qty: 20 Tab, Refills: 0    Associated Diagnoses: History of  delivery      docusate sodium (Colace) 100 mg capsule Take 1 Cap by mouth two (2) times daily as needed for Constipation for up to 30 doses. Qty: 60 Cap, Refills: 0         CONTINUE these medications which have NOT CHANGED    Details   prenatal vit-calcium-iron-fa (Prenatal Plus, calcium carb,) 27 mg iron- 1 mg tab Take 1 Tab by mouth daily. STOP taking these medications       buPROPion XL (WELLBUTRIN XL) 300 mg XL tablet Comments:   Reason for Stopping:         triamcinolone acetonide (KENALOG) 0.1 % ointment Comments:   Reason for Stopping:         ALPRAZolam (XANAX) 0.5 mg tablet Comments:   Reason for Stopping:         aluminum chloride (HYPERCARE) 15 % (w/v) liqd Comments:   Reason for Stopping:         SUMAtriptan (IMITREX) 50 mg tablet Comments:   Reason for Stopping:         levonorgestrel (MIRENA) 20 mcg/24 hr (5 years) IUD Comments:   Reason for Stopping:               Reference my discharge instructions.     Follow-up Appointments   Procedures    FOLLOW UP VISIT Appointment in: 6 Weeks     Standing Status:   Standing     Number of Occurrences:   1     Order Specific Question:   Appointment in     Answer:   6 Weeks        Signed By:  Kristopher Roque MD     May 10, 2021

## 2021-05-10 NOTE — DISCHARGE INSTRUCTIONS
Delivery (Postpartum Care): After Your Visit    Your Care Instructions    Your baby was delivered through a cut, called an incision, in your belly. This surgery is called a  delivery, or a . After childbirth (postpartum period), your body goes through many changes. In the next few weeks, your body will slowly heal. It can take 4 weeks or more for the incision from your surgery to heal. It is easy to get too tired and overwhelmed during the first weeks after your baby is born. You may feel emotional during this time. Changes in your hormones can shift your mood without warning. It is easy to get too tired and overwhelmed during the first weeks after childbirth. Take it easy on yourself. You may find it hard to meet the extra demands on your energy and time. Get rest whenever you can, accept help from others, and eat well and drink plenty of fluids. After a , you may have gas or need to burp a lot. You may have some light vaginal bleeding or spotting for up to 6 weeks after surgery. It is normal to have pain in the incision area off and on during the next 6 months. No driving for 1 week after delivery. No heavy lifting. No more than 8 lbs or the size of baby for 2-3 weeks. Limit use of stairs. 1-2 times per day for the first week after delivery. Follow-up care is a key part of your treatment and safety. Be sure to make and go to all appointments, and call your doctor if you are having problems. Its also a good idea to know your test results and keep a list of the medicines you take. Around 4 to 6 weeks after your baby's birth, you will have a follow-up visit with your doctor. This visit is your time to talk to your doctor about anything you are concerned or curious about. Keep a list of questions to bring to your postpartum visit. Your questions might be about:  Changes in your breasts, such as lumps or soreness.   When to expect your menstrual period to start again.  What form of birth control is best for you. Weight you have put on during the pregnancy. Exercise options. What foods and drinks are best for you, especially if you are breast-feeding. Problems you might be having with breast-feeding. When you can have sex. Some women may want to talk about lubricants for the vagina. Any feelings of sadness or restlessness that you are having. How can you care for yourself at home? Be sure that you understand any instructions your doctor has given you after surgery. This will guide your activities and tell you what to watch for in the next few weeks. Vaginal bleeding and cramps  After delivery, you will have a bloody discharge from the vagina. This will turn pink within a week and then white or yellow after about 10 days. It may last for 2 to 4 weeks or longer, until the uterus has healed. You may have cramps for the first few days after childbirth. These are normal and occur as the uterus shrinks to normal size. Take an over-the-counter pain medicine, such as acetaminophen (Tylenol), ibuprofen (Advil, Motrin), or naproxen (Aleve), for cramps. Read and follow all instructions on the label. Do not take aspirin, because it can cause more bleeding. Take other medicines exactly as prescribed. Call your doctor if you have any problems with your medicine. Incision  You may have had staples removed while you were in the hospital. Keep the area clean, and wash it with water and mild soap. If you had stitches, they should dissolve on their own and should not need to be removed. Follow your doctor's instructions for cleaning the stitched area. If you have steri-strips (tape) the will come off on their own in 7-10 days. Breast-feeding and breast fullness  Breast-feed your baby in positions that do not put pressure on your incision, such as side-lying or football-hold positions.  Ask your nurse, doctor, midwife, or lactation specialist to show you these positions if you do not know what they are. Your breasts may overfill (engorge) in the first few days after delivery. To help milk flow and to relieve pain, warm your breasts in the shower or by using warm, moist towels before nursing. Express a small amount of milk to soften your breast near the nipple and then feed your baby. Put an ice or cold pack on your breast for a few minutes after nursing to reduce swelling and pain. Put a thin cloth between the ice pack and your skin. If you are not nursing, do not put warmth on your breasts or touch your breasts. Wear a tight bra or sports bra, and use an ice or cold pack on your breasts to reduce swelling and pain. Breast fullness usually lasts 3 to 4 days. Activity  Eat a balanced diet. Do not try to lose weight by cutting calories. Keep taking your prenatal vitamins, or take a multivitamin. Get as much rest as you can. Try to take naps when your baby sleeps during the day. Get help with household chores from family or friends, if you can. Do not try to do it all yourself. Get some gentle exercise, such as walking, every day. Do not lift more than 10 pounds for the next 4 to 6 weeks. Do not have sex or use tampons until you have stopped bleeding and at least 4 to 6 weeks have passed since you gave birth. Talk to your doctor about birth control. You can get pregnant even before your period returns. Also, you can get pregnant while you are breast-feeding. Mental health  It is normal to have some sadness, anxiety, sleeplessness, and mood swings after you go home. If you feel upset or hopeless for more than a few days, talk to your doctor. If you have any thoughts of hurting yourself or anyone else--including your baby--call 911 or go to the emergency room. Many women get the \"baby blues\" during the first few days after childbirth. The \"baby blues\" usually peak around the fourth day and then ease up in less than 2 weeks.  If you have the \"baby blues\" for more than a few days, or if you have thoughts of hurting yourself or your baby, call your doctor right away. Constipation and hemorrhoids  Drink plenty of fluids (8 to 10 glasses a day), especially water. · Eat plenty of fiber each day to avoid constipation. Include foods such as whole-grain breads and cereals, raw vegetables, raw and dried fruits, and beans. · If you have hemorrhoids or swelling or pain around the opening of your vagina, try using cold and heat. You can put ice or a cold pack on the area for 10 to 20 minutes at a time. Put a thin cloth between the ice and your skin. Also try sitting in a few inches of warm water (sitz bath) 3 times a day and after bowel movements. Drink plenty of fluids, enough so that your urine is light yellow or clear like water. If you have kidney, heart, or liver disease and have to limit fluids, talk with your doctor before you increase the amount of fluids you drink. When should you call for help? Call 911 anytime you think you may need emergency care. For example, call if:  You are thinking of hurting yourself, your baby, or anyone else. You have sudden, severe pain in your belly. You pass out (lose consciousness). Call your doctor now or seek immediate medical care if:  You have severe vaginal bleeding. You are passing blood clots and soaking through a pad each hour for 2 or more hours. Your vaginal bleeding seems to be getting heavier or is still bright red 4 days after delivery, or you pass blood clots larger than the size of a golf ball. You have increased redness, heat, or drainage in the incision area. You are dizzy or lightheaded, or you feel like you may faint. You are vomiting or cannot keep fluids down. You have a fever. You have new or more belly pain. You pass tissue (not just blood). The incision seems to be pulling open or starts bleeding. Your vaginal discharge smells bad. Your belly feels more tender or full and hard.   You have pain or redness in one or both breasts. You feel sad, anxious, or hopeless for more than a few days. Where can you learn more? Go to [email or web address]    Enter V850 in the search box to learn more about \" Delivery: After Your Visit\". or     Go to [email or web address]    Enter F030  in the search box to learn more about \"Postpartum Care: After Your Visit\". This care instruction is for use with your licensed healthcare professional. If you have questions about a medical condition or this instruction, always ask your healthcare professional. Care instructions adapted by [facility] (which disclaims liability or warranty for this information) from [facility], [address] [place] [address] 2008. [facility] disclaims any warranty or liability for your use of this information.

## 2021-05-10 NOTE — PROGRESS NOTES
Post-Operative  Day 2    Reynoldandie DASILVA Dom     Assessment: Post-Op day 2, doing well from RLTCS p term PROM. Hgb 10.7>10.1. Plan:   1. Routine post-operative care  2. Circ consent re-reviewed with patient prior to procedure. Information for the patient's :  Omid Alcala [[id number]]   , Low Transverse      Patient doing well without significant complaint. Nausea and vomiting resolved, tolerating lfull diet, passing flatus, voiding and ambulating without difficulty. Vitals:  Visit Vitals  /71   Pulse (!) 111   Temp 97.7 °F (36.5 °C)   Resp 16   Ht 5' 7\" (1.702 m)   Wt 95.3 kg (210 lb)   LMP  (LMP Unknown)   SpO2 98%   Breastfeeding Yes   BMI 32.89 kg/m²     Temp (24hrs), Av.1 °F (36.7 °C), Min:97.7 °F (36.5 °C), Max:98.5 °F (36.9 °C)        Exam:        Patient without distress. Abdomen, bowel sounds present, soft, expected tenderness, fundus firm                Wound incision clean, dry and intact               Lower extremities are negative for swelling, cords or tenderness. Labs:     No results found for this or any previous visit (from the past 24 hour(s)).

## 2021-05-10 NOTE — LACTATION NOTE
This note was copied from a baby's chart. 36 hours of life  Independent mother, working on latching and milk transfer. Pt will successfully establish breastfeeding by feeding in response to early feeding cues or wake every 3h, will obtain deep latch, and will keep log of feedings/output. Taught to BF at hunger cues and or q 2-3 hrs and to offer 10-20 drops of hand expressed colostrum at any non-feeds. Mother began pumping for sleepy/drowsy sessions. 25 ml volume to date. 1000 6 ml ebm    Breast Assessment  Left Breast: Medium  Left Nipple: Everted, Intact, Large  Right Breast: Medium  Right Nipple: Intact, Large, Everted  Breast- Feeding Assessment  Attends Breast-Feeding Classes: No  Breast-Feeding Experience: Yes(10 months pumping with first/difficulty/no latch)        LATCH Documentation  Latch: Repeated attempts, hold nipple in mouth, stimulate to suck  Audible Swallowing: None  Type of Nipple: Everted (after stimulation)  Comfort (Breast/Nipple): Soft/non-tender  Hold (Positioning): Full assist, teach one side, mother does other, staff holds  LATCH Score: 6  This is a drowsy feeding latch score. Expect infant to latch/nurse eagerly as hours/days progress. Excellent skills with baby positioning and offering breast.  Manual massage, compression and expression of milk instructed to lead each feeding. Benefits of increasing milk production as well as milk transfer to baby with this low tech but highly effective stimulation process reviewed. Sized up to 27 mm flanges from previous sessions using 24mm  Comfort fit and comfort level of suctioning observed. [address] [facility] # provided. Will observe next feeding/pending circ. Advised to use ebm gtts and not sweet ease for resumed better feeding sessions.   Call prn

## 2022-01-25 ENCOUNTER — HOSPITAL ENCOUNTER (EMERGENCY)
Age: 32
Discharge: HOME | End: 2022-01-25
Attending: EMERGENCY MEDICINE
Payer: COMMERCIAL

## 2022-01-25 ENCOUNTER — APPOINTMENT (OUTPATIENT)
Dept: GENERAL RADIOLOGY | Age: 32
End: 2022-01-25
Attending: PHYSICIAN ASSISTANT
Payer: COMMERCIAL

## 2022-01-25 VITALS
BODY MASS INDEX: 31.32 KG/M2 | TEMPERATURE: 98 F | RESPIRATION RATE: 16 BRPM | OXYGEN SATURATION: 98 % | SYSTOLIC BLOOD PRESSURE: 131 MMHG | DIASTOLIC BLOOD PRESSURE: 73 MMHG | WEIGHT: 199.96 LBS | HEART RATE: 87 BPM

## 2022-01-25 DIAGNOSIS — S60.221A CONTUSION OF RIGHT HAND, INITIAL ENCOUNTER: Primary | ICD-10-CM
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
## 2022-01-25 PROCEDURE — 99281 EMR DPT VST MAYX REQ PHY/QHP: CPT

## 2022-01-25 PROCEDURE — 73130 X-RAY EXAM OF HAND: CPT
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
## 2022-01-25 NOTE — Clinical Note
[address]  [facility] EMERGENCY DEPT  [address] [place] [address] [phone]    Work/School Note    Date: 1/25/2022    To Whom It May concern:    Vitaliy Bach was seen and treated today in the emergency room by the following provider(s):  Attending Provider: Judy Moise MD  Physician Assistant: Rosita Sr, [address]. Vitaliy Bach is excused from work/school on 01/25/22 and 01/26/22. She is medically clear to return to work/school on 1/27/2022.        Sincerely,          Rc Levin, [address]

## 2022-01-25 NOTE — DISCHARGE INSTRUCTIONS
It was a pleasure taking care of you at [facility] Emergency Department today. We know that when you come to [facility], you are entrusting us with your health, comfort, and safety. Our physicians and nurses honor that trust, and we truly appreciate the opportunity to care for you and your loved ones. We also value your feedback. If you receive a survey about your Emergency Department experience today, please fill it out. We care about our patients' feedback, and we listen to what you have to say. Thank you!

## 2022-02-04 ENCOUNTER — TELEPHONE (OUTPATIENT)
Dept: INTERNAL MEDICINE CLINIC | Age: 32
End: 2022-02-04

## 2022-02-04 ENCOUNTER — NURSE TRIAGE (OUTPATIENT)
Dept: OTHER | Facility: CLINIC | Age: 32
End: 2022-02-04
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
## 2022-03-18 PROBLEM — Z98.891 HISTORY OF CESAREAN DELIVERY: Status: ACTIVE | Noted: 2021-05-08

## 2022-03-18 PROBLEM — O42.90 PROM (PREMATURE RUPTURE OF MEMBRANES): Status: ACTIVE | Noted: 2021-05-08

## 2022-03-18 PROBLEM — G43.109 MIGRAINE WITH AURA AND WITHOUT STATUS MIGRAINOSUS, NOT INTRACTABLE: Status: ACTIVE | Noted: 2018-04-12

## 2022-03-20 PROBLEM — D64.9 ANEMIA: Status: ACTIVE | Noted: 2017-04-26

## 2022-03-25 ENCOUNTER — OFFICE VISIT (OUTPATIENT)
Dept: INTERNAL MEDICINE CLINIC | Age: 32
End: 2022-03-25
Payer: COMMERCIAL

## 2022-03-25 VITALS
BODY MASS INDEX: 31.23 KG/M2 | RESPIRATION RATE: 20 BRPM | SYSTOLIC BLOOD PRESSURE: 122 MMHG | WEIGHT: 199 LBS | HEART RATE: 90 BPM | OXYGEN SATURATION: 99 % | DIASTOLIC BLOOD PRESSURE: 69 MMHG | HEIGHT: 67 IN | TEMPERATURE: 98.7 F

## 2022-03-25 DIAGNOSIS — M54.2 NECK PAIN: ICD-10-CM

## 2022-03-25 DIAGNOSIS — E66.9 OBESITY (BMI 30-39.9): ICD-10-CM

## 2022-03-25 DIAGNOSIS — F41.1 GAD (GENERALIZED ANXIETY DISORDER): ICD-10-CM
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
## 2022-03-25 PROCEDURE — 99213 OFFICE O/P EST LOW 20 MIN: CPT | Performed by: INTERNAL MEDICINE

## 2022-03-25 RX ORDER — BUPROPION HYDROCHLORIDE 300 MG/1
300 TABLET ORAL DAILY
COMMUNITY
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
## 2022-04-15 ENCOUNTER — TELEPHONE (OUTPATIENT)
Dept: INTERNAL MEDICINE CLINIC | Age: 32
End: 2022-04-15
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
## 2022-05-01 ENCOUNTER — HOSPITAL ENCOUNTER (OUTPATIENT)
Dept: CT IMAGING | Age: 32
Discharge: HOME | End: 2022-05-01
Attending: INTERNAL MEDICINE
Payer: COMMERCIAL
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
## 2022-05-01 PROCEDURE — 72125 CT NECK SPINE W/O DYE: CPT

## 2023-02-23 ENCOUNTER — OFFICE VISIT (OUTPATIENT)
Dept: INTERNAL MEDICINE CLINIC | Age: 33
End: 2023-02-23
Payer: COMMERCIAL

## 2023-02-23 VITALS
HEIGHT: 67 IN | RESPIRATION RATE: 16 BRPM | OXYGEN SATURATION: 98 % | BODY MASS INDEX: 31.71 KG/M2 | DIASTOLIC BLOOD PRESSURE: 70 MMHG | TEMPERATURE: 98 F | SYSTOLIC BLOOD PRESSURE: 108 MMHG | WEIGHT: 202 LBS | HEART RATE: 97 BPM

## 2023-02-23 DIAGNOSIS — L30.8 OTHER ECZEMA: Primary | ICD-10-CM

## 2023-02-23 RX ORDER — CIPROFLOXACIN 250 MG/1
TABLET, FILM COATED ORAL
COMMUNITY
Start: 2023-02-20

## 2023-02-23 RX ORDER — TRIAMCINOLONE ACETONIDE 1 MG/G
CREAM TOPICAL
Qty: 15 G | Refills: 3 | Status: SHIPPED | OUTPATIENT
Start: 2023-02-23 | End: 2023-02-24 | Stop reason: ALTCHOICE

## 2023-02-23 RX ORDER — FLUOCINONIDE CREAM (EMULSIFIED BASE) 0.5 MG/G
CREAM TOPICAL 2 TIMES DAILY
Qty: 15 G | Refills: 3 | Status: SHIPPED | OUTPATIENT
Start: 2023-02-23

## 2023-02-23 NOTE — PROGRESS NOTES
1. Have you been to the ER, urgent care clinic since your last visit? Hospitalized since your last visit?no    2. Have you seen or consulted any other health care providers outside of the [address] [place] [address] since your last visit? Include any pap smears or colon screening.  No    Chief Complaint   Patient presents with    Establish Care

## 2023-02-23 NOTE — PATIENT INSTRUCTIONS
Start lidex twice a day on the affected area for 2 weeks. Do not use longer than 2 weeks period. For face and skin folds, use very thin layer of lidex only once a day for 4-5 days. Then you can switch to low potent triamcinolone and don't use more than 2 weeks. You can also try bleach bath once a month and see if that helps. Continue moisturizing twice a day with Eucerin and Vaseline after the flare ups are controlled with prescribed medicines. Please call your dermatologist and make an appointment to manage Eczema.

## 2023-02-23 NOTE — PROGRESS NOTES
Rehana Mancuso is a 34 y.o. female , new patient, here for evaluation of the following chief complaint(s): Establish Care and Skin Problem (eczema)     Subjective:    Eczema Review:  Pt complains of continued eczematous rash to cheeks, back, flexor of elbow, but not on lower body. Improved by nothing, has tried triamcinolone in the past. Worsened by stress, sickness and weather changes. Using Vaseline and Eucerin with minimal relief. Denies any other problems at the visit now. Taking Cipro for UTI prescribed by Ob Gyn on . No symptoms now. Refused flu vaccine. Review of Systems  Constitutional: negative for fevers, chills, anorexia and weight loss  Eyes:   negative for visual disturbance and irritation  ENT:   negative for tinnitus,sore throat,nasal congestion,ear pains. hoarseness  Respiratory:  negative for cough, hemoptysis, dyspnea,wheezing  CV:   negative for chest pain, palpitations, lower extremity edema  GI:   negative for nausea, vomiting, diarrhea, abdominal pain,melena  Endo:               negative for polyuria,polydipsia,polyphagia,heat intolerance  Genitourinary: negative for frequency, dysuria and hematuria  Hematologic:  negative for easy bruising and gum/nose bleeding  Musculoskel: negative for myalgias, arthralgias, back pain, muscle weakness, joint pain  Neurological:  negative for headaches, dizziness, vertigo, memory problems and gait   Behavl/Psych: negative for feelings of anxiety, depression, mood changes    Past Medical History:   Diagnosis Date    Anemia      Past Surgical History:   Procedure Laterality Date    HX  SECTION       Social History     Socioeconomic History    Marital status: SINGLE    Number of children: 0   Tobacco Use    Smoking status: Former    Smokeless tobacco: Never    Tobacco comments:     light social smoker   Substance and Sexual Activity    Alcohol use: Yes     Comment: socially in moderation    Drug use: No    Sexual activity: Yes Partners: Male     Birth control/protection: Condom, Pill   Other Topics Concern    Caffeine Concern Yes     Comment: 3-4 cups of coffee a day, 1 glass of tea a day     Family History   Problem Relation Age of Onset    Diabetes Mother     Heart Disease Father      Current Outpatient Medications   Medication Sig Dispense Refill    triamcinolone acetonide (KENALOG) 0.025 % topical cream Apply  to affected area two (2) times a day. use thin layer for face and skin folds. 15 g 3    ciprofloxacin HCl (CIPRO) 250 mg tablet       fluocinonide-emollient (LIDEX-E) 0.05 % topical cream Apply  to affected area two (2) times a day. 15 g 3    buPROPion XL (WELLBUTRIN XL) 300 mg XL tablet Take 300 mg by mouth daily. No Known Allergies    Objective:  Visit Vitals  /70   Pulse 97   Temp 98 °F (36.7 °C) (Oral)   Resp 16   Ht 5' 7\" (1.702 m)   Wt 202 lb (91.6 kg)   SpO2 98%   BMI 31.64 kg/m²     Physical Exam:   General appearance - alert, well appearing, and in no distress  Mental status - alert, oriented to person, place, and time  Chest - clear to auscultation, no wheezes, rales or rhonchi, symmetric air entry   Heart - normal rate, regular rhythm, normal S1, S2, no murmurs, rubs, clicks or gallops   Abdomen - soft, nontender, nondistended, no masses or organomegaly  Lymph- no adenopathy palpable  Ext-peripheral pulses normal, no pedal edema, no clubbing or cyanosis  Skin-Warm and dry. no vitiligo, or suspicious lesions, hyperpigmentation and scaly rashes on cheeks, back and flexor area of elbow bilateral  Neuro -alert, oriented, normal speech, no focal findings or movement disorder noted    Assessment/Plan:    Medication Side Effects and Warnings were discussed with patient: yes   Patient Labs were reviewed: yes  Patient Past Records were reviewed: yes  See orders below      ICD-10-CM ICD-9-CM    1.  Other eczema  L30.8 692.9 REFERRAL TO DERMATOLOGY      fluocinonide-emollient (LIDEX-E) 0.05 % topical cream triamcinolone acetonide (KENALOG) 0.025 % topical cream      DISCONTINUED: triamcinolone acetonide (KENALOG) 0.1 % topical cream          Orders Placed This Encounter    REFERRAL TO DERMATOLOGY     Referral Priority:   Routine     Referral Type:   Consultation     Referral Reason:   Specialty Services Required     Referred to Provider:   Mayo Leonard MD     Requested Specialty:   Dermatology Physician     Number of Visits Requested:   1    ciprofloxacin HCl (CIPRO) 250 mg tablet    DISCONTD: triamcinolone acetonide (KENALOG) 0.1 % topical cream     Sig: Apply  to affected area two (2) times daily as needed for Skin Irritation. use thin layer     Dispense:  15 g     Refill:  3    fluocinonide-emollient (LIDEX-E) 0.05 % topical cream     Sig: Apply  to affected area two (2) times a day. Dispense:  15 g     Refill:  3    triamcinolone acetonide (KENALOG) 0.025 % topical cream     Sig: Apply  to affected area two (2) times a day. use thin layer for face and skin folds. Dispense:  15 g     Refill:  3       Patient Instructions   Start lidex twice a day on the affected area for 2 weeks. Do not use longer than 2 weeks period. For face and skin folds, use very thin layer of lidex only once a day for 4-5 days. Then you can switch to low potent triamcinolone and don't use more than 2 weeks. You can also try bleach bath once a month and see if that helps. Continue moisturizing twice a day with Eucerin and Vaseline after the flare ups are controlled with prescribed medicines. Please call your dermatologist and make an appointment to manage Eczema. Follow-up and Dispositions    Return in about 4 weeks (around 3/23/2023) for OV: Annual and labs. I have reviewed with the patient details of the assessment and plan and all questions were answered. Relevent patient education was performed. The most recent lab findings were reviewed with the patient.     An After Visit Summary was printed and given to the patient.     Signed By: Salas Jonas NP     February 24, 2023         -----------------------------------------------------------------------------------------------------------------------------------------

## 2023-02-24 RX ORDER — TRIAMCINOLONE ACETONIDE 0.25 MG/G
CREAM TOPICAL 2 TIMES DAILY
Qty: 15 G | Refills: 3 | Status: SHIPPED | OUTPATIENT
Start: 2023-02-24

## 2023-03-31 DIAGNOSIS — F41.9 ANXIETY: Primary | ICD-10-CM

## 2023-03-31 RX ORDER — BUPROPION HYDROCHLORIDE 300 MG/1
300 TABLET ORAL DAILY
Qty: 30 TABLET | Refills: 2 | Status: SHIPPED | OUTPATIENT
Start: 2023-03-31

## 2023-05-22 RX ORDER — BUPROPION HYDROCHLORIDE 300 MG/1
300 TABLET ORAL DAILY
COMMUNITY
Start: 2023-03-31

## 2023-05-22 RX ORDER — FLUOCINONIDE CREAM (EMULSIFIED BASE) 0.5 MG/G
CREAM TOPICAL 2 TIMES DAILY
COMMUNITY
Start: 2023-02-23

## 2023-05-22 RX ORDER — CIPROFLOXACIN 250 MG/1
TABLET, FILM COATED ORAL
COMMUNITY
Start: 2023-02-20

## 2023-05-22 RX ORDER — TRIAMCINOLONE ACETONIDE 0.25 MG/G
CREAM TOPICAL 2 TIMES DAILY
COMMUNITY
Start: 2023-02-24

## 2023-07-05 RX ORDER — BUPROPION HYDROCHLORIDE 300 MG/1
TABLET ORAL
Qty: 30 TABLET | Refills: 0 | OUTPATIENT
Start: 2023-07-05

## 2023-08-03 ENCOUNTER — TELEPHONE (OUTPATIENT)
Age: 33
End: 2023-08-03

## 2023-08-03 NOTE — TELEPHONE ENCOUNTER
----- Message from Beatrice Gonsales sent at 8/3/2023  4:06 PM EDT -----  Subject: Refill Request    QUESTIONS  Name of Medication? buPROPion (WELLBUTRIN XL) 300 MG extended release   tablet  Patient-reported dosage and instructions? 300 mg, 1x daily  How many days do you have left? 0  Preferred Pharmacy? [address] [place] [address] [address]  Pharmacy phone number (if available)? [phone]  Additional Information for Provider? Patients provider left the practice   [facility] and she doesnt have a new appointment until Oct. Patient   stated she is out of her medication. Patient stated she did see another   provider but cant remember her name and does not show in chart.   ---------------------------------------------------------------------------  --------------  Giulia Marine Kym  What is the best way for the office to contact you? OK to leave message on   voicemail  Preferred Call Back Phone Number? [phone]  ---------------------------------------------------------------------------  --------------  SCRIPT ANSWERS  Relationship to Patient?  Self

## 2023-08-10 RX ORDER — BUPROPION HYDROCHLORIDE 300 MG/1
300 TABLET ORAL DAILY
Qty: 30 TABLET | OUTPATIENT
Start: 2023-08-10

## 2023-08-10 RX ORDER — BUPROPION HYDROCHLORIDE 300 MG/1
TABLET ORAL
Qty: 30 TABLET | Refills: 0 | OUTPATIENT
Start: 2023-08-10

## 2024-02-04 SDOH — HEALTH STABILITY: PHYSICAL HEALTH: ON AVERAGE, HOW MANY MINUTES DO YOU ENGAGE IN EXERCISE AT THIS LEVEL?: 30 MIN

## 2024-02-04 SDOH — HEALTH STABILITY: PHYSICAL HEALTH: ON AVERAGE, HOW MANY DAYS PER WEEK DO YOU ENGAGE IN MODERATE TO STRENUOUS EXERCISE (LIKE A BRISK WALK)?: 2 DAYS

## 2024-02-07 ENCOUNTER — TELEMEDICINE (OUTPATIENT)
Age: 34
End: 2024-02-07
Payer: COMMERCIAL

## 2024-02-07 DIAGNOSIS — L30.8 OTHER SPECIFIED DERMATITIS: ICD-10-CM

## 2024-02-07 DIAGNOSIS — E55.9 VITAMIN D DEFICIENCY: ICD-10-CM

## 2024-02-07 DIAGNOSIS — Z76.89 ENCOUNTER TO ESTABLISH CARE: Primary | ICD-10-CM

## 2024-02-07 DIAGNOSIS — F43.9 STRESS: ICD-10-CM

## 2024-02-07 DIAGNOSIS — R53.83 OTHER FATIGUE: ICD-10-CM

## 2024-02-07 DIAGNOSIS — Z76.89 ENCOUNTER TO ESTABLISH CARE: ICD-10-CM

## 2024-02-07 PROCEDURE — 99423 OL DIG E/M SVC 21+ MIN: CPT | Performed by: CLINICAL NURSE SPECIALIST

## 2024-02-07 RX ORDER — BUPROPION HYDROCHLORIDE 150 MG/1
TABLET ORAL
Qty: 30 TABLET | Refills: 0 | Status: SHIPPED | OUTPATIENT
Start: 2024-02-07

## 2024-02-07 SDOH — ECONOMIC STABILITY: FOOD INSECURITY: WITHIN THE PAST 12 MONTHS, THE FOOD YOU BOUGHT JUST DIDN'T LAST AND YOU DIDN'T HAVE MONEY TO GET MORE.: NEVER TRUE

## 2024-02-07 SDOH — ECONOMIC STABILITY: HOUSING INSECURITY
IN THE LAST 12 MONTHS, WAS THERE A TIME WHEN YOU DID NOT HAVE A STEADY PLACE TO SLEEP OR SLEPT IN A SHELTER (INCLUDING NOW)?: NO

## 2024-02-07 SDOH — ECONOMIC STABILITY: INCOME INSECURITY: HOW HARD IS IT FOR YOU TO PAY FOR THE VERY BASICS LIKE FOOD, HOUSING, MEDICAL CARE, AND HEATING?: NOT HARD AT ALL

## 2024-02-07 SDOH — ECONOMIC STABILITY: FOOD INSECURITY: WITHIN THE PAST 12 MONTHS, YOU WORRIED THAT YOUR FOOD WOULD RUN OUT BEFORE YOU GOT MONEY TO BUY MORE.: NEVER TRUE

## 2024-02-07 ASSESSMENT — ENCOUNTER SYMPTOMS
RESPIRATORY NEGATIVE: 1
GASTROINTESTINAL NEGATIVE: 1

## 2024-02-07 ASSESSMENT — PATIENT HEALTH QUESTIONNAIRE - PHQ9
SUM OF ALL RESPONSES TO PHQ QUESTIONS 1-9: 0
1. LITTLE INTEREST OR PLEASURE IN DOING THINGS: 0
SUM OF ALL RESPONSES TO PHQ9 QUESTIONS 1 & 2: 0
SUM OF ALL RESPONSES TO PHQ QUESTIONS 1-9: 0
SUM OF ALL RESPONSES TO PHQ QUESTIONS 1-9: 0
2. FEELING DOWN, DEPRESSED OR HOPELESS: 0
SUM OF ALL RESPONSES TO PHQ QUESTIONS 1-9: 0

## 2024-02-07 NOTE — PROGRESS NOTES
1. \"Have you been to the ER, urgent care clinic since your last visit?  Hospitalized since your last visit?\" No    2. \"Have you seen or consulted any other health care providers outside of the [facility] since your last visit?\" NEW PATIENT     3. For patients aged 45-75: Has the patient had a colonoscopy / FIT/ Cologuard?   NO      If the patient is female:    4. For patients aged 40-74: Has the patient had a mammogram within the past 2 years? No    5. For patients aged 21-65: Has the patient had a pap smear? Yes

## 2024-02-07 NOTE — PROGRESS NOTES
Carmen Nolan, was evaluated through a synchronous (real-time) audio-video encounter. The patient (or guardian if applicable) is aware that this is a billable service, which includes applicable co-pays. This Virtual Visit was conducted with patient's (and/or legal guardian's) consent. Patient identification was verified, and a caregiver was present when appropriate.   The patient was located at Other: Work  Provider was located at Home (Appt Dept [place]      Carmen Nolan (:  1990) is a New patient, presenting virtually for evaluation of the following:    Assessment & Plan   Below is the assessment and plan developed based on review of pertinent history, physical exam, labs, studies, and medications.  1. Encounter to establish care  -     CBC with Auto Differential; Future  -     Comprehensive Metabolic Panel; Future  -     Lipid Panel; Future  2. Other specified dermatitis  3. Other fatigue  -     TSH; Future  4. Vitamin D deficiency  -     Vitamin D 25 Hydroxy; Future  5. Stress    Educated on symptoms of stress/anxiety including brain fog and decreased concentration.  Restart Wellbutrin, discussed use and timeframe to see max efficacy.  Encouraged to seek out therapy resources provided by job, instructed to advise if unable.  We will obtain baseline labs to rule out physiological cause of fatigue and weight gain.  Educated on indication for caloric deficit as well as 70 ounces of water daily.  High suspicion for intertrigo, discussed lifestyle changes to mitigate.  Trial nystatin triamcinolone topically twice daily.  We will follow-up in about 6 weeks on Wellbutrin as well as weight.  Aware of need to obtain blood work prior to follow-up visit.  Encouraged to call with any questions or concerns.        Return in about 6 weeks (around 3/20/2024).       Subjective   Carmen presents virtually to establish care. PMH/PSH/Famhx as documented.  States that she is generally doing okay.  Admits that

## 2024-03-11 RX ORDER — BUPROPION HYDROCHLORIDE 150 MG/1
TABLET ORAL
Qty: 30 TABLET | Refills: 0 | OUTPATIENT
Start: 2024-03-11

## 2024-03-11 RX ORDER — BUPROPION HYDROCHLORIDE 300 MG/1
300 TABLET ORAL EVERY MORNING
Qty: 90 TABLET | Refills: 0 | Status: SHIPPED | OUTPATIENT
Start: 2024-03-11 | End: 2024-06-09

## 2024-03-11 NOTE — TELEPHONE ENCOUNTER
02.07.24 03.29.24  From: Carmen Nolan  To: Office of Coretta Solis  Sent: 3/10/2024  9:02 PM EDT  Subject: Medication Renewal Request    Refills have been requested for the following medications:        buPROPion (WELLBUTRIN XL) 150 MG extended release tablet [Coretta Solis, APRN - CNP]      Patient Comment: I have tried the 300mg dosage as recommended and it has been going well!     Preferred pharmacy: [facility] [address] [place] [address] - P [phone] - F [phone]

## 2024-03-29 ENCOUNTER — OFFICE VISIT (OUTPATIENT)
Age: 34
End: 2024-03-29
Payer: COMMERCIAL

## 2024-03-29 VITALS
WEIGHT: 215 LBS | HEIGHT: 67 IN | OXYGEN SATURATION: 98 % | DIASTOLIC BLOOD PRESSURE: 70 MMHG | RESPIRATION RATE: 17 BRPM | HEART RATE: 87 BPM | BODY MASS INDEX: 33.74 KG/M2 | TEMPERATURE: 98 F | SYSTOLIC BLOOD PRESSURE: 110 MMHG

## 2024-03-29 DIAGNOSIS — R53.83 OTHER FATIGUE: Primary | ICD-10-CM

## 2024-03-29 DIAGNOSIS — Z13.1 SCREENING FOR DIABETES MELLITUS: ICD-10-CM

## 2024-03-29 DIAGNOSIS — R51.9 MORNING HEADACHE: ICD-10-CM

## 2024-03-29 DIAGNOSIS — R06.83 SNORING: ICD-10-CM

## 2024-03-29 PROCEDURE — 99214 OFFICE O/P EST MOD 30 MIN: CPT | Performed by: CLINICAL NURSE SPECIALIST

## 2024-03-29 RX ORDER — LEVONORGESTREL 52 MG/1
1 INTRAUTERINE DEVICE INTRAUTERINE ONCE
COMMUNITY
Start: 2021-06-17

## 2024-03-29 RX ORDER — TIRZEPATIDE 2.5 MG/.5ML
INJECTION, SOLUTION SUBCUTANEOUS
Qty: 3 ML | Refills: 0 | Status: SHIPPED | OUTPATIENT
Start: 2024-03-29 | End: 2024-06-27

## 2024-03-29 RX ORDER — TRIAMCINOLONE ACETONIDE 0.25 MG/G
CREAM TOPICAL 2 TIMES DAILY
Qty: 30 G | Refills: 0 | Status: SHIPPED | OUTPATIENT
Start: 2024-03-29

## 2024-03-29 NOTE — PROGRESS NOTES
Chief Complaint   Patient presents with    Follow-up Chronic Condition    Migraine    Anxiety     \"Have you been to the ER, urgent care clinic since your last visit?  Hospitalized since your last visit?\"    NO    “Have you seen or consulted any other health care providers outside of [facility] since your last visit?”    NO     “Have you had a pap smear?”    [facility]    No cervical cancer screening on file             Click Here for Release of Records Request

## 2024-03-29 NOTE — PROGRESS NOTES
Carmen Nolan (:  1990) is a 33 y.o. female,Established patient, here for evaluation of the following chief complaint(s):  Follow-up Chronic Condition, Migraine, and Anxiety         ASSESSMENT/PLAN:  1. Other fatigue  -     [facility] - Delphine Bee MD, [facility] ([address])  2. Snoring  -     [facility] - Delphine Bee MD, [facility] ([address])  3. Morning headache  -     [facility] - Delphine Bee MD, [facility] ([address])  4. Screening for diabetes mellitus  -     Hemoglobin A1C; Future  5. BMI 33.0-33.9,adult    We will continue Wellbutrin dosage for now and reassess in a couple of weeks.   Reinforced caloric deficit and a minimum of 150 minutes of aerobic exercise weekly.   Discussed phentermine vs zepound, shared decision made to trial zepoud for obesity.   Suspect that AM headaches are related to EDEL and not wellbutrin given history.   Will refer to sleep medicine for further evaluation.   Will follow up pending test results, encouraged to call with questions or concerns in interim.         Return in about 3 months (around 2024).         Subjective   SUBJECTIVE/OBJECTIVE:  Carmen presents for a follow up visit. Was previously seen to establish care and to get back on Wellbutrin. Has been taking Wellbutrin as prescribed, tolerating it well.   Admits that she has definitely noticed improvement in her anxiety, she is not worrying as much but still feels that her focus is not great. Life is still very busy at the moment.  Greatest source of stress currently is that she is studying to take a state exam. Things should settle down in the near future. She has concerns about her weight.   Weight is up about 13 pounds since previously reported weight, admits that she has continued to count calories and exercise time permitting. Also trying to drink more water.  Has noted that she is waking up with a headache recently, this has been going on for at least a couple of months,

## 2024-06-06 ASSESSMENT — SLEEP AND FATIGUE QUESTIONNAIRES
DO YOU HAVE DIFFICULTY OPERATING A MOTOR VEHICLE FOR SHORT DISTANCES (LESS THAN 100 MILES) BECAUSE YOU BECOME SLEEPY: NO
HOW LIKELY ARE YOU TO NOD OFF OR FALL ASLEEP WHILE LYING DOWN TO REST IN THE AFTERNOON WHEN CIRCUMSTANCES PERMIT: MODERATE CHANCE OF DOZING
AVERAGE NUMBER OF SLEEP HOURS: 6
DO YOU HAVE DIFFICULTY WATCHING A MOVIE OR VIDEO BECAUSE YOU BECOME SLEEPY OR TIRED: YES, A LITTLE
DO YOU GET TOO LITTLE SLEEP AT NIGHT: NO
HOW LIKELY ARE YOU TO NOD OFF OR FALL ASLEEP WHILE SITTING AND READING: WOULD NEVER DOZE
HOW LIKELY ARE YOU TO NOD OFF OR FALL ASLEEP WHEN YOU ARE A PASSENGER IN A CAR FOR AN HOUR WITHOUT A BREAK: SLIGHT CHANCE OF DOZING
NUMBER OF TIMES YOU WAKE PER NIGHT: 1
DO YOU HAVE DIFFICULTY OPERATING A MOTOR VEHICLE FOR LONG DISTANCES (GREATER THAN 100 MILES) BECAUSE YOU BECOME SLEEPY: NO
HOW LIKELY ARE YOU TO NOD OFF OR FALL ASLEEP WHEN YOU ARE A PASSENGER IN A CAR FOR AN HOUR WITHOUT A BREAK: SLIGHT CHANCE OF DOZING
HAS YOUR RELATIONSHIP WITH FAMILY, FRIENDS OR WORK COLLEAGUES BEEN AFFECTED BECAUSE YOU ARE SLEEPY OR TIRED: YES, A LITTLE
DO YOU TAKE NAPS: NO
ARE YOU BOTHERED BY WAKING UP TOO EARLY AND NOT BEING ABLE TO GET BACK TO SLEEP: NO
DO YOU HAVE DIFFICULTY BEING AS ACTIVE AS YOU WANT TO BE IN THE MORNING BECAUSE YOU ARE SLEEPY OR TIRED: YES, LITTLE
HOW LIKELY ARE YOU TO NOD OFF OR FALL ASLEEP WHILE WATCHING TV: MODERATE CHANCE OF DOZING
HOW LIKELY ARE YOU TO NOD OFF OR FALL ASLEEP IN A CAR, WHILE STOPPED FOR A FEW MINUTES IN TRAFFIC: WOULD NEVER DOZE
FOSQ SCORE: 14.5
DO YOU WORK SHIFTS: NO
HOW LIKELY ARE YOU TO NOD OFF OR FALL ASLEEP WHILE WATCHING TV: MODERATE CHANCE OF DOZING
WHAT TIME DO YOU USUALLY WAKE UP: 05:45
HAS YOUR MOOD BEEN AFFECTED BECAUSE YOU ARE SLEEPY OR TIRED: YES, MODERATE
SELECT ANY OF THE FOLLOWING BEHAVIORS OBSERVED WHILE YOU ARE ASLEEP: LOUD SNORING
HOW LIKELY ARE YOU TO NOD OFF OR FALL ASLEEP WHILE SITTING INACTIVE IN A PUBLIC PLACE: WOULD NEVER DOZE
SELECT ANY OF THE FOLLOWING BEHAVIORS OBSERVED WHILE PATIENT ASLEEP: LOUD SNORING
DO YOU HAVE DIFFICULTY CONCENTRATING ON THE THINGS YOU DO BECAUSE YOU ARE SLEEPY OR TIRED: YES, MODERATE
DO YOU GET TOO LITTLE SLEEP AT NIGHT: NO
HOW LIKELY ARE YOU TO NOD OFF OR FALL ASLEEP WHILE LYING DOWN TO REST IN THE AFTERNOON WHEN CIRCUMSTANCES PERMIT: MODERATE CHANCE OF DOZING
AVERAGE NUMBER OF SLEEP HOURS: 6
DO YOU HAVE DIFFICULTY BEING AS ACTIVE AS YOU WANT TO BE IN THE EVENING BECAUSE YOU ARE SLEEPY OR TIRED: YES, MODERATE
HOW LIKELY ARE YOU TO NOD OFF OR FALL ASLEEP WHILE SITTING QUIETLY AFTER LUNCH WITHOUT ALCOHOL: SLIGHT CHANCE OF DOZING
ARE YOU BOTHERED BY WAKING UP TOO EARLY AND NOT BEING ABLE TO GET BACK TO SLEEP: NO
HOW LIKELY ARE YOU TO NOD OFF OR FALL ASLEEP WHILE SITTING QUIETLY AFTER LUNCH WITHOUT ALCOHOL: SLIGHT CHANCE OF DOZING
DO YOU HAVE PROBLEMS WITH FREQUENT AWAKENINGS AT NIGHT: YES
HOW LIKELY ARE YOU TO NOD OFF OR FALL ASLEEP WHILE SITTING AND TALKING TO SOMEONE: WOULD NEVER DOZE
HOW LIKELY ARE YOU TO NOD OFF OR FALL ASLEEP WHILE SITTING AND TALKING TO SOMEONE: WOULD NEVER DOZE
ESS TOTAL SCORE: 6
HOW LIKELY ARE YOU TO NOD OFF OR FALL ASLEEP WHILE SITTING INACTIVE IN A PUBLIC PLACE: WOULD NEVER DOZE
HOW LIKELY ARE YOU TO NOD OFF OR FALL ASLEEP IN A CAR, WHILE STOPPED FOR A FEW MINUTES IN TRAFFIC: WOULD NEVER DOZE
DO YOU HAVE DIFFICULTY VISITING YOUR FAMILY OR FRIENDS IN THEIR HOME BECAUSE YOU BECOME SLEEPY OR TIRED: NO
HOW LIKELY ARE YOU TO NOD OFF OR FALL ASLEEP WHILE SITTING AND READING: WOULD NEVER DOZE
DO YOU GENERALLY HAVE DIFFICULTY REMEMBERING THINGS BECAUSE YOU ARE SLEEPY OR TIRED: YES, MODERATE

## 2024-06-07 ENCOUNTER — TELEPHONE (OUTPATIENT)
Age: 34
End: 2024-06-07

## 2024-06-07 ENCOUNTER — OFFICE VISIT (OUTPATIENT)
Age: 34
End: 2024-06-07
Payer: COMMERCIAL

## 2024-06-07 VITALS
HEIGHT: 67 IN | OXYGEN SATURATION: 100 % | TEMPERATURE: 98.4 F | HEART RATE: 88 BPM | DIASTOLIC BLOOD PRESSURE: 73 MMHG | BODY MASS INDEX: 33.45 KG/M2 | WEIGHT: 213.1 LBS | SYSTOLIC BLOOD PRESSURE: 120 MMHG

## 2024-06-07 DIAGNOSIS — F41.1 GENERALIZED ANXIETY DISORDER: ICD-10-CM

## 2024-06-07 DIAGNOSIS — G47.33 OSA (OBSTRUCTIVE SLEEP APNEA): Primary | ICD-10-CM

## 2024-06-07 PROCEDURE — 99204 OFFICE O/P NEW MOD 45 MIN: CPT | Performed by: INTERNAL MEDICINE

## 2024-06-07 NOTE — TELEPHONE ENCOUNTER
Called patient left a voice message to schedule a home sleep test  appointment.  Cost estimate scanned in Media.

## 2024-06-07 NOTE — PROGRESS NOTES
[address], [place] [address]  Tel.  [phone]    Fax. [phone]     [address],   [place] [address]  Tel.  [phone]    Fax. [phone] [address] [place] [address]  Tel.  [phone]    Fax. [phone]       Carmen Nolan is a 33 y.o. year old female referred by Coretta Solis CNP for evaluation of a sleep disorder.       ASSESSMENT/PLAN:     Diagnosis Orders   1. EDEL (obstructive sleep apnea)  PAT - Home Sleep Test      2. Generalized anxiety disorder        3. BMI 33.0-33.9,adult            Patient has a history and examination consistent with the diagnosis of sleep apnea.    Return for follow-up after testing is completed.    * The patient currently has a Minimal risk for having sleep apnea.  STOP-BANG score 1.    * Sleep testing was ordered for initial evaluation.      Orders Placed This Encounter   Procedures    PAT - Home Sleep Test     Standing Status:   Future     Standing Expiration Date:   12/7/2024     Order Specific Question:   Location For Sleep Study     Answer:   [facility]       * She was provided information on sleep apnea including corresponding risk factors and the importance of proper treatment.     * Treatment options were reviewed in detail. she would like to proceed with PAP therapy. Patient will be seen in follow-up in 6-8 weeks after PAP setup to gauge treatment response and adherence to therapy.     * The patient was counseled regarding proper sleep hygiene, with emphasis on ensuring sufficient total sleep time; safe driving and the benefits of exercise and weight loss.      * All of her questions were addressed.    2. Generalized anxiety disorder  -  continue on current regimen, she will continue to monitor her mood and follow up with her care provider for reevaluation/adjustment of medications if warranted.  I have reviewed the

## 2024-06-07 NOTE — PATIENT INSTRUCTIONS
label. Alcohol naturally makes you sleepy and on its own can cause accidents. Combined with excessive drowsiness its effects are amplified.   Signs of Drowsy Driving:   * You don't remember driving the last few miles   * You may drift out of your jocelyn   * You are unable to focus and your thoughts wander   * You may yawn more often than normal   * You have difficulty keeping your eyes open / nodding off   * Missing traffic signs, speeding, or tailgating  Prevention-   Good sleep hygiene, lifestyle and behavioral choices have the most impact on drowsy driving. There is no substitute for sleep and the average person requires 8 hours nightly. If you find yourself driving drowsy, stop and sleep. Consider the sleep hygiene tips provided during your visit as well.     Medication Refill Policy: Refills for all medications require 1 week advance notice. Please have your pharmacy fax a refill request. We are unable to fax, or call in \"controled substance\" medications and you will need to pick these prescriptions up from our office.

## 2024-06-28 ENCOUNTER — OFFICE VISIT (OUTPATIENT)
Age: 34
End: 2024-06-28
Payer: COMMERCIAL

## 2024-06-28 VITALS
WEIGHT: 216 LBS | DIASTOLIC BLOOD PRESSURE: 80 MMHG | HEART RATE: 93 BPM | OXYGEN SATURATION: 96 % | SYSTOLIC BLOOD PRESSURE: 110 MMHG | HEIGHT: 67 IN | BODY MASS INDEX: 33.9 KG/M2

## 2024-06-28 DIAGNOSIS — F41.9 ANXIETY: ICD-10-CM

## 2024-06-28 DIAGNOSIS — R41.840 DIFFICULTY CONCENTRATING: Primary | ICD-10-CM

## 2024-06-28 DIAGNOSIS — E66.9 OBESITY (BMI 30-39.9): ICD-10-CM

## 2024-06-28 PROCEDURE — 99213 OFFICE O/P EST LOW 20 MIN: CPT | Performed by: CLINICAL NURSE SPECIALIST

## 2024-06-28 RX ORDER — BUPROPION HYDROCHLORIDE 300 MG/1
300 TABLET ORAL EVERY MORNING
Qty: 90 TABLET | Refills: 1 | Status: SHIPPED | OUTPATIENT
Start: 2024-06-28 | End: 2024-12-25

## 2024-06-28 RX ORDER — PHENTERMINE HYDROCHLORIDE 15 MG/1
15 CAPSULE ORAL EVERY MORNING
Qty: 30 CAPSULE | Refills: 2 | Status: SHIPPED | OUTPATIENT
Start: 2024-06-28 | End: 2024-09-26

## 2024-06-28 ASSESSMENT — ENCOUNTER SYMPTOMS: SHORTNESS OF BREATH: 0

## 2024-06-28 NOTE — PROGRESS NOTES
Carmen Nolan (:  1990) is a 33 y.o. female,Established patient, here for evaluation of the following chief complaint(s):  Follow-up      Assessment & Plan   1. Difficulty concentrating  2. Obesity (BMI 30-39.9)  -     phentermine 15 MG capsule; Take 1 capsule by mouth every morning for 90 days. Max Daily Amount: 15 mg, Disp-30 capsule, R-2Normal  3. Anxiety    Will refer to psychology for further evaluation of difficulty concentrating.   Advised if ADHD diagnosis received, can be seen in this office for management.   Continue with caloric deficit and routine exercise, shared decision made to start phentermine.   Educated on indication, use, and potential side effects.   Anxiety is stable, continue wellbutrin.   Encouraged to call with any questions or concerns.     Return in about 3 months (around 2024).       Chuck Morales presents for a follow up visit. States that she is generally doing alright. 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 Recently completed one of her board exams which was a relief.   She will be going to [place] next week for five days, she is looking forward to this much needed break. She does have a couple of concerns. Has been unable to start GLP1 due to unavailability.   Has continued weight loss efforts, but is not losing weight. Would like to explore other options. Also has noted difficulty concentrating which is interfering with work and day to day activities.   She has never been evaluated for this in the past. Tolerating her wellbutrin, would like a refill. Admits that she has not gotten her labs drawn, but intends to do so in the near future.       Review of Systems   Respiratory:  Negative for shortness of breath.    Cardiovascular:  Negative for chest pain.   Neurological:  Negative for headaches.        Current Outpatient Medications on File Prior to Visit   Medication Sig Dispense Refill    levonorgestrel (MIRENA, 52 MG,) IUD 52 mg 1 each by

## 2024-06-28 NOTE — PROGRESS NOTES
Chief Complaint   Patient presents with    Follow-up     Pt states she is having trouble focusing at work and at home. She also states last visit she mentioned her concern about weight gain but medication was on back order. Would like to discuss about other options.    \"Have you been to the ER, urgent care clinic since your last visit?  Hospitalized since your last visit?\"    NO    “Have you seen or consulted any other health care providers outside of [facility] since your last visit?”    NO     “Have you had a pap smear?”    YES - [facility]  Nurse/CMA to request most recent records if not in the chart    No cervical cancer screening on file             Click Here for Release of Records Request

## 2025-01-22 RX ORDER — BUPROPION HYDROCHLORIDE 300 MG/1
300 TABLET ORAL EVERY MORNING
Qty: 30 TABLET | Refills: 0 | Status: SHIPPED | OUTPATIENT
Start: 2025-01-22

## 2025-02-08 SDOH — ECONOMIC STABILITY: FOOD INSECURITY: WITHIN THE PAST 12 MONTHS, YOU WORRIED THAT YOUR FOOD WOULD RUN OUT BEFORE YOU GOT MONEY TO BUY MORE.: NEVER TRUE

## 2025-02-08 SDOH — ECONOMIC STABILITY: FOOD INSECURITY: WITHIN THE PAST 12 MONTHS, THE FOOD YOU BOUGHT JUST DIDN'T LAST AND YOU DIDN'T HAVE MONEY TO GET MORE.: NEVER TRUE

## 2025-02-08 SDOH — ECONOMIC STABILITY: TRANSPORTATION INSECURITY
IN THE PAST 12 MONTHS, HAS THE LACK OF TRANSPORTATION KEPT YOU FROM MEDICAL APPOINTMENTS OR FROM GETTING MEDICATIONS?: NO

## 2025-02-08 SDOH — ECONOMIC STABILITY: TRANSPORTATION INSECURITY
IN THE PAST 12 MONTHS, HAS LACK OF TRANSPORTATION KEPT YOU FROM MEETINGS, WORK, OR FROM GETTING THINGS NEEDED FOR DAILY LIVING?: NO

## 2025-02-08 SDOH — ECONOMIC STABILITY: INCOME INSECURITY: IN THE LAST 12 MONTHS, WAS THERE A TIME WHEN YOU WERE NOT ABLE TO PAY THE MORTGAGE OR RENT ON TIME?: NO

## 2025-02-11 ENCOUNTER — TELEMEDICINE (OUTPATIENT)
Age: 35
End: 2025-02-11
Payer: COMMERCIAL

## 2025-02-11 ENCOUNTER — TELEPHONE (OUTPATIENT)
Age: 35
End: 2025-02-11

## 2025-02-11 DIAGNOSIS — E66.9 OBESITY (BMI 30-39.9): Primary | ICD-10-CM

## 2025-02-11 PROCEDURE — 99213 OFFICE O/P EST LOW 20 MIN: CPT | Performed by: CLINICAL NURSE SPECIALIST

## 2025-02-11 RX ORDER — ATOMOXETINE 40 MG/1
40 CAPSULE ORAL DAILY
COMMUNITY
Start: 2024-11-25

## 2025-02-11 ASSESSMENT — ENCOUNTER SYMPTOMS
ABDOMINAL PAIN: 0
SHORTNESS OF BREATH: 0

## 2025-02-11 ASSESSMENT — PATIENT HEALTH QUESTIONNAIRE - PHQ9
SUM OF ALL RESPONSES TO PHQ QUESTIONS 1-9: 0
2. FEELING DOWN, DEPRESSED OR HOPELESS: NOT AT ALL
1. LITTLE INTEREST OR PLEASURE IN DOING THINGS: NOT AT ALL
SUM OF ALL RESPONSES TO PHQ9 QUESTIONS 1 & 2: 0
SUM OF ALL RESPONSES TO PHQ QUESTIONS 1-9: 0

## 2025-02-11 NOTE — PROGRESS NOTES
Chief Complaint   Patient presents with    Discuss Medications    Weight Loss     \"Have you been to the ER, urgent care clinic since your last visit?  Hospitalized since your last visit?\"    NO    “Have you seen or consulted any other health care providers outside our system since your last visit?”    NO     “Have you had a pap smear?”    NO    No cervical cancer screening on file

## 2025-02-11 NOTE — PROGRESS NOTES
Carmen Nolan, was evaluated through a synchronous (real-time) audio-video encounter. The patient (or guardian if applicable) is aware that this is a billable service, which includes applicable co-pays. This Virtual Visit was conducted with patient's (and/or legal guardian's) consent. Patient identification was verified, and a caregiver was present when appropriate.   The patient was located at Home: [address] [place] [id number]  Provider was located at Home (Appt Dept [place]  Confirm you are appropriately licensed, registered, or certified to deliver care in the state where the patient is located as indicated above. If you are not or unsure, please re-schedule the visit: Yes, I confirm.     Carmen Nolan (:  1990) is a Established patient, presenting virtually for evaluation of the following:      Below is the assessment and plan developed based on review of pertinent history, physical exam, labs, studies, and medications.     Assessment & Plan  Obesity (BMI 30-39.9)    We will trial tirzepatide, discussed use and potential side effects. She will continue with her dietary and exercise efforts.     Orders:    Tirzepatide 2.5 MG/0.5ML SOAJ; Inject 2.5 mg into the skin every 7 days      No follow-ups on file.       Subjective   Carmen presents for a problem visit. Reports that she continues to have issues with weight loss, current weight is 213. Previously took phentermine for 3 months but reports no changes to appetite nor weight. She is taking strattera for ADHD, expected that this would also curve her appetite but has not noted this benefit. 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 She has been consuming a high protein, high fiber diet which historically has helped with weight loss but unfortunately not seeing any benefits. She has also made efforts to increase her water

## 2025-02-24 RX ORDER — BUPROPION HYDROCHLORIDE 300 MG/1
TABLET ORAL
Qty: 30 TABLET | Refills: 0 | Status: SHIPPED | OUTPATIENT
Start: 2025-02-24

## 2025-02-24 NOTE — TELEPHONE ENCOUNTER
Last appt 2/11/2025      Next Apt:     No future appointments.      [id number] [address] - [address]  - P [phone] - F [phone] [address] [facility] [address] [facility] [address]  Phone: [phone] Fax: [phone]

## 2025-03-04 RX ORDER — TRIAMCINOLONE ACETONIDE 0.25 MG/G
CREAM TOPICAL 2 TIMES DAILY
Qty: 30 G | Refills: 0 | Status: SHIPPED | OUTPATIENT
Start: 2025-03-04

## 2025-03-31 RX ORDER — BUPROPION HYDROCHLORIDE 300 MG/1
300 TABLET ORAL EVERY MORNING
Qty: 90 TABLET | Refills: 0 | Status: SHIPPED | OUTPATIENT
Start: 2025-03-31

## 2025-04-21 RX ORDER — PANTOPRAZOLE SODIUM 40 MG/1
40 TABLET, DELAYED RELEASE ORAL
Qty: 30 TABLET | Refills: 1 | Status: SHIPPED | OUTPATIENT
Start: 2025-04-21 | End: 2025-06-20

## 2025-06-10 ENCOUNTER — CLINICAL DOCUMENTATION (OUTPATIENT)
Age: 35
End: 2025-06-10

## 2025-08-12 ENCOUNTER — TELEMEDICINE (OUTPATIENT)
Age: 35
End: 2025-08-12
Payer: COMMERCIAL

## 2025-08-12 DIAGNOSIS — R53.83 OTHER FATIGUE: ICD-10-CM

## 2025-08-12 DIAGNOSIS — E55.9 VITAMIN D DEFICIENCY: ICD-10-CM

## 2025-08-12 DIAGNOSIS — Z13.1 SCREENING FOR DIABETES MELLITUS: ICD-10-CM

## 2025-08-12 DIAGNOSIS — R53.83 OTHER FATIGUE: Primary | ICD-10-CM

## 2025-08-12 DIAGNOSIS — E66.9 OBESITY (BMI 30-39.9): ICD-10-CM

## 2025-08-12 PROCEDURE — 99214 OFFICE O/P EST MOD 30 MIN: CPT | Performed by: CLINICAL NURSE SPECIALIST

## 2025-08-12 RX ORDER — TRIAMCINOLONE ACETONIDE 0.25 MG/G
CREAM TOPICAL
Qty: 15 G | Refills: 0 | Status: SHIPPED | OUTPATIENT
Start: 2025-08-12

## 2025-08-12 RX ORDER — BUPROPION HYDROCHLORIDE 300 MG/1
300 TABLET ORAL EVERY MORNING
Qty: 90 TABLET | Refills: 0 | Status: SHIPPED | OUTPATIENT
Start: 2025-08-12

## 2025-08-12 ASSESSMENT — ENCOUNTER SYMPTOMS: RESPIRATORY NEGATIVE: 1

## 2025-08-22 RX ORDER — TRIAMCINOLONE ACETONIDE 0.25 MG/G
CREAM TOPICAL
Qty: 30 G | Refills: 0 | Status: SHIPPED | OUTPATIENT
Start: 2025-08-22